# Patient Record
Sex: FEMALE | Race: BLACK OR AFRICAN AMERICAN | NOT HISPANIC OR LATINO | URBAN - METROPOLITAN AREA
[De-identification: names, ages, dates, MRNs, and addresses within clinical notes are randomized per-mention and may not be internally consistent; named-entity substitution may affect disease eponyms.]

---

## 2023-05-16 ENCOUNTER — HOSPITAL ENCOUNTER (OUTPATIENT)
Facility: OTHER | Age: 63
Discharge: HOME | End: 2023-05-19
Attending: EMERGENCY MEDICINE | Admitting: INTERNAL MEDICINE
Payer: MEDICAID

## 2023-05-16 DIAGNOSIS — R50.9 FEVER: ICD-10-CM

## 2023-05-16 DIAGNOSIS — Z79.4 TYPE 2 DIABETES MELLITUS WITH HYPERGLYCEMIA, WITH LONG-TERM CURRENT USE OF INSULIN: ICD-10-CM

## 2023-05-16 DIAGNOSIS — N17.9 AKI (ACUTE KIDNEY INJURY): ICD-10-CM

## 2023-05-16 DIAGNOSIS — G93.40 ACUTE ENCEPHALOPATHY: Primary | ICD-10-CM

## 2023-05-16 DIAGNOSIS — R41.82 AMS (ALTERED MENTAL STATUS): ICD-10-CM

## 2023-05-16 DIAGNOSIS — B20 HIV DISEASE: ICD-10-CM

## 2023-05-16 DIAGNOSIS — E11.65 TYPE 2 DIABETES MELLITUS WITH HYPERGLYCEMIA, WITH LONG-TERM CURRENT USE OF INSULIN: ICD-10-CM

## 2023-05-16 LAB
ALBUMIN SERPL BCP-MCNC: 4.1 G/DL (ref 3.5–5.2)
ALP SERPL-CCNC: 100 U/L (ref 55–135)
ALT SERPL W/O P-5'-P-CCNC: 41 U/L (ref 10–44)
AMPHET+METHAMPHET UR QL: NEGATIVE
ANION GAP SERPL CALC-SCNC: 11 MMOL/L (ref 8–16)
AST SERPL-CCNC: 49 U/L (ref 10–40)
BACTERIA #/AREA URNS HPF: ABNORMAL /HPF
BARBITURATES UR QL SCN>200 NG/ML: NEGATIVE
BASOPHILS # BLD AUTO: 0.07 K/UL (ref 0–0.2)
BASOPHILS NFR BLD: 0.8 % (ref 0–1.9)
BENZODIAZ UR QL SCN>200 NG/ML: NEGATIVE
BILIRUB SERPL-MCNC: 0.4 MG/DL (ref 0.1–1)
BILIRUB UR QL STRIP: NEGATIVE
BUN SERPL-MCNC: 30 MG/DL (ref 8–23)
BZE UR QL SCN: NEGATIVE
CALCIUM SERPL-MCNC: 9.7 MG/DL (ref 8.7–10.5)
CANNABINOIDS UR QL SCN: ABNORMAL
CHLORIDE SERPL-SCNC: 104 MMOL/L (ref 95–110)
CLARITY CSF: CLEAR
CLARITY UR: ABNORMAL
CO2 SERPL-SCNC: 24 MMOL/L (ref 23–29)
COLOR CSF: COLORLESS
COLOR UR: YELLOW
CREAT SERPL-MCNC: 1.8 MG/DL (ref 0.5–1.4)
CREAT SERPL-MCNC: 2 MG/DL (ref 0.5–1.4)
CREAT UR-MCNC: 79.3 MG/DL (ref 15–325)
CSF TUBE NUMBER: 1
CSF TUBE NUMBER: 1
CSF, COMMENT: ABNORMAL
CTP QC/QA: YES
DIFFERENTIAL METHOD: ABNORMAL
EOSINOPHIL # BLD AUTO: 0.1 K/UL (ref 0–0.5)
EOSINOPHIL NFR BLD: 1.1 % (ref 0–8)
ERYTHROCYTE [DISTWIDTH] IN BLOOD BY AUTOMATED COUNT: 11.3 % (ref 11.5–14.5)
EST. GFR  (NO RACE VARIABLE): 28 ML/MIN/1.73 M^2
ETHANOL SERPL-MCNC: <10 MG/DL
ETHANOL UR-MCNC: <10 MG/DL
FIO2: 21
GLUCOSE CSF-MCNC: 135 MG/DL (ref 40–70)
GLUCOSE SERPL-MCNC: 173 MG/DL (ref 70–110)
GLUCOSE UR QL STRIP: ABNORMAL
HCT VFR BLD AUTO: 38.4 % (ref 37–48.5)
HGB BLD-MCNC: 13 G/DL (ref 12–16)
HGB UR QL STRIP: NEGATIVE
IMM GRANULOCYTES # BLD AUTO: 0.03 K/UL (ref 0–0.04)
IMM GRANULOCYTES NFR BLD AUTO: 0.3 % (ref 0–0.5)
KETONES UR QL STRIP: NEGATIVE
LDH SERPL L TO P-CCNC: 2.25 MMOL/L (ref 0.5–2.2)
LEUKOCYTE ESTERASE UR QL STRIP: ABNORMAL
LYMPHOCYTES # BLD AUTO: 3.2 K/UL (ref 1–4.8)
LYMPHOCYTES NFR BLD: 36.6 % (ref 18–48)
MCH RBC QN AUTO: 33.8 PG (ref 27–31)
MCHC RBC AUTO-ENTMCNC: 33.9 G/DL (ref 32–36)
MCV RBC AUTO: 100 FL (ref 82–98)
METHADONE UR QL SCN>300 NG/ML: NEGATIVE
MICROSCOPIC COMMENT: ABNORMAL
MONOCYTES # BLD AUTO: 0.7 K/UL (ref 0.3–1)
MONOCYTES NFR BLD: 8.3 % (ref 4–15)
NEUTROPHILS # BLD AUTO: 4.6 K/UL (ref 1.8–7.7)
NEUTROPHILS NFR BLD: 52.9 % (ref 38–73)
NITRITE UR QL STRIP: POSITIVE
NRBC BLD-RTO: 0 /100 WBC
OPIATES UR QL SCN: NEGATIVE
PCP UR QL SCN>25 NG/ML: NEGATIVE
PH UR STRIP: 7 [PH] (ref 5–8)
PLATELET # BLD AUTO: 224 K/UL (ref 150–450)
PMV BLD AUTO: 9.3 FL (ref 9.2–12.9)
POC TEMPERATURE: ABNORMAL
POCT GLUCOSE: 144 MG/DL (ref 70–110)
POTASSIUM SERPL-SCNC: 4 MMOL/L (ref 3.5–5.1)
PROCALCITONIN SERPL IA-MCNC: 0.06 NG/ML
PROT CSF-MCNC: 90 MG/DL (ref 15–40)
PROT SERPL-MCNC: 8.5 G/DL (ref 6–8.4)
PROT UR QL STRIP: NEGATIVE
RBC # BLD AUTO: 3.85 M/UL (ref 4–5.4)
RBC # CSF: 6 /CU MM
RBC #/AREA URNS HPF: 2 /HPF (ref 0–4)
SAMPLE: ABNORMAL
SAMPLE: ABNORMAL
SARS-COV-2 RDRP RESP QL NAA+PROBE: NEGATIVE
SODIUM SERPL-SCNC: 139 MMOL/L (ref 136–145)
SP GR UR STRIP: 1.01 (ref 1–1.03)
SPECIMEN VOL CSF: 7 ML
SQUAMOUS #/AREA URNS HPF: 6 /HPF
TOXICOLOGY INFORMATION: ABNORMAL
URN SPEC COLLECT METH UR: ABNORMAL
UROBILINOGEN UR STRIP-ACNC: NEGATIVE EU/DL
WBC # BLD AUTO: 8.72 K/UL (ref 3.9–12.7)
WBC # CSF: 0 /CU MM (ref 0–5)
WBC #/AREA URNS HPF: 69 /HPF (ref 0–5)
YEAST URNS QL MICRO: ABNORMAL

## 2023-05-16 PROCEDURE — 93010 ELECTROCARDIOGRAM REPORT: CPT | Mod: ,,, | Performed by: INTERNAL MEDICINE

## 2023-05-16 PROCEDURE — 63600175 PHARM REV CODE 636 W HCPCS: Performed by: EMERGENCY MEDICINE

## 2023-05-16 PROCEDURE — 99223 PR INITIAL HOSPITAL CARE,LEVL III: ICD-10-PCS | Mod: ,,, | Performed by: NURSE PRACTITIONER

## 2023-05-16 PROCEDURE — 82077 ASSAY SPEC XCP UR&BREATH IA: CPT | Performed by: EMERGENCY MEDICINE

## 2023-05-16 PROCEDURE — 80053 COMPREHEN METABOLIC PANEL: CPT | Performed by: EMERGENCY MEDICINE

## 2023-05-16 PROCEDURE — 84157 ASSAY OF PROTEIN OTHER: CPT | Performed by: EMERGENCY MEDICINE

## 2023-05-16 PROCEDURE — 80307 DRUG TEST PRSMV CHEM ANLYZR: CPT | Performed by: EMERGENCY MEDICINE

## 2023-05-16 PROCEDURE — G0378 HOSPITAL OBSERVATION PER HR: HCPCS

## 2023-05-16 PROCEDURE — 96366 THER/PROPH/DIAG IV INF ADDON: CPT

## 2023-05-16 PROCEDURE — 87205 SMEAR GRAM STAIN: CPT | Performed by: EMERGENCY MEDICINE

## 2023-05-16 PROCEDURE — 62270 DX LMBR SPI PNXR: CPT

## 2023-05-16 PROCEDURE — 96367 TX/PROPH/DG ADDL SEQ IV INF: CPT | Mod: 59

## 2023-05-16 PROCEDURE — 87088 URINE BACTERIA CULTURE: CPT | Performed by: EMERGENCY MEDICINE

## 2023-05-16 PROCEDURE — 84145 PROCALCITONIN (PCT): CPT | Performed by: EMERGENCY MEDICINE

## 2023-05-16 PROCEDURE — 87086 URINE CULTURE/COLONY COUNT: CPT | Performed by: EMERGENCY MEDICINE

## 2023-05-16 PROCEDURE — 96361 HYDRATE IV INFUSION ADD-ON: CPT | Mod: 59

## 2023-05-16 PROCEDURE — 93010 EKG 12-LEAD: ICD-10-PCS | Mod: ,,, | Performed by: INTERNAL MEDICINE

## 2023-05-16 PROCEDURE — 82565 ASSAY OF CREATININE: CPT

## 2023-05-16 PROCEDURE — 25000003 PHARM REV CODE 250: Performed by: NURSE PRACTITIONER

## 2023-05-16 PROCEDURE — 99223 1ST HOSP IP/OBS HIGH 75: CPT | Mod: ,,, | Performed by: NURSE PRACTITIONER

## 2023-05-16 PROCEDURE — 81000 URINALYSIS NONAUTO W/SCOPE: CPT | Performed by: EMERGENCY MEDICINE

## 2023-05-16 PROCEDURE — 87070 CULTURE OTHR SPECIMN AEROBIC: CPT | Performed by: EMERGENCY MEDICINE

## 2023-05-16 PROCEDURE — 83605 ASSAY OF LACTIC ACID: CPT

## 2023-05-16 PROCEDURE — 82945 GLUCOSE OTHER FLUID: CPT | Performed by: EMERGENCY MEDICINE

## 2023-05-16 PROCEDURE — 25000003 PHARM REV CODE 250: Performed by: EMERGENCY MEDICINE

## 2023-05-16 PROCEDURE — 99291 CRITICAL CARE FIRST HOUR: CPT

## 2023-05-16 PROCEDURE — 93005 ELECTROCARDIOGRAM TRACING: CPT | Mod: 59

## 2023-05-16 PROCEDURE — 89051 BODY FLUID CELL COUNT: CPT | Performed by: EMERGENCY MEDICINE

## 2023-05-16 PROCEDURE — 82962 GLUCOSE BLOOD TEST: CPT

## 2023-05-16 PROCEDURE — 96365 THER/PROPH/DIAG IV INF INIT: CPT | Mod: 59

## 2023-05-16 PROCEDURE — 85025 COMPLETE CBC W/AUTO DIFF WBC: CPT | Performed by: EMERGENCY MEDICINE

## 2023-05-16 PROCEDURE — 99000 SPECIMEN HANDLING OFFICE-LAB: CPT | Performed by: EMERGENCY MEDICINE

## 2023-05-16 PROCEDURE — 99900035 HC TECH TIME PER 15 MIN (STAT)

## 2023-05-16 PROCEDURE — 87040 BLOOD CULTURE FOR BACTERIA: CPT | Mod: 59 | Performed by: EMERGENCY MEDICINE

## 2023-05-16 RX ORDER — HYDROXYZINE PAMOATE 25 MG/1
25 CAPSULE ORAL 3 TIMES DAILY
COMMUNITY
Start: 2023-05-12

## 2023-05-16 RX ORDER — PRAVASTATIN SODIUM 40 MG/1
40 TABLET ORAL
COMMUNITY
Start: 2023-04-19

## 2023-05-16 RX ORDER — HEPARIN SODIUM 5000 [USP'U]/ML
5000 INJECTION, SOLUTION INTRAVENOUS; SUBCUTANEOUS EVERY 8 HOURS
Status: DISCONTINUED | OUTPATIENT
Start: 2023-05-17 | End: 2023-05-19 | Stop reason: HOSPADM

## 2023-05-16 RX ORDER — ELVITEGRAVIR, COBICISTAT, EMTRICITABINE, AND TENOFOVIR ALAFENAMIDE 150; 150; 200; 10 MG/1; MG/1; MG/1; MG/1
1 TABLET ORAL
COMMUNITY
Start: 2022-09-14

## 2023-05-16 RX ORDER — NALOXONE HCL 0.4 MG/ML
0.02 VIAL (ML) INJECTION
Status: DISCONTINUED | OUTPATIENT
Start: 2023-05-16 | End: 2023-05-19 | Stop reason: HOSPADM

## 2023-05-16 RX ORDER — ACETAMINOPHEN 500 MG
1000 TABLET ORAL
Status: COMPLETED | OUTPATIENT
Start: 2023-05-16 | End: 2023-05-16

## 2023-05-16 RX ORDER — DEXTROSE 40 %
30 GEL (GRAM) ORAL
Status: DISCONTINUED | OUTPATIENT
Start: 2023-05-16 | End: 2023-05-19 | Stop reason: HOSPADM

## 2023-05-16 RX ORDER — INSULIN GLARGINE 100 [IU]/ML
30 INJECTION, SOLUTION SUBCUTANEOUS
COMMUNITY
Start: 2023-04-19

## 2023-05-16 RX ORDER — BUPROPION HYDROCHLORIDE 150 MG/1
150 TABLET ORAL EVERY MORNING
COMMUNITY
Start: 2023-05-03

## 2023-05-16 RX ORDER — SODIUM CHLORIDE 9 MG/ML
INJECTION, SOLUTION INTRAVENOUS CONTINUOUS
Status: DISCONTINUED | OUTPATIENT
Start: 2023-05-16 | End: 2023-05-17

## 2023-05-16 RX ORDER — DULAGLUTIDE 1.5 MG/.5ML
1.5 INJECTION, SOLUTION SUBCUTANEOUS
COMMUNITY
Start: 2023-03-08

## 2023-05-16 RX ORDER — FAMOTIDINE 40 MG/1
40 TABLET, FILM COATED ORAL
COMMUNITY
Start: 2023-05-12

## 2023-05-16 RX ORDER — INSULIN LISPRO 100 [IU]/ML
22 INJECTION, SOLUTION INTRAVENOUS; SUBCUTANEOUS
COMMUNITY
Start: 2023-04-19

## 2023-05-16 RX ORDER — GLUCAGON 1 MG
1 KIT INJECTION
Status: DISCONTINUED | OUTPATIENT
Start: 2023-05-16 | End: 2023-05-19 | Stop reason: HOSPADM

## 2023-05-16 RX ORDER — ONDANSETRON 2 MG/ML
4 INJECTION INTRAMUSCULAR; INTRAVENOUS EVERY 8 HOURS PRN
Status: DISCONTINUED | OUTPATIENT
Start: 2023-05-16 | End: 2023-05-19 | Stop reason: HOSPADM

## 2023-05-16 RX ORDER — DEXTROSE 40 %
15 GEL (GRAM) ORAL
Status: DISCONTINUED | OUTPATIENT
Start: 2023-05-16 | End: 2023-05-19 | Stop reason: HOSPADM

## 2023-05-16 RX ORDER — ACETAMINOPHEN 325 MG/1
650 TABLET ORAL EVERY 4 HOURS PRN
Status: DISCONTINUED | OUTPATIENT
Start: 2023-05-16 | End: 2023-05-19 | Stop reason: HOSPADM

## 2023-05-16 RX ORDER — INSULIN ASPART 100 [IU]/ML
1-10 INJECTION, SOLUTION INTRAVENOUS; SUBCUTANEOUS
Status: DISCONTINUED | OUTPATIENT
Start: 2023-05-16 | End: 2023-05-19 | Stop reason: HOSPADM

## 2023-05-16 RX ORDER — GABAPENTIN 800 MG/1
800 TABLET ORAL 3 TIMES DAILY
COMMUNITY
Start: 2023-05-03

## 2023-05-16 RX ORDER — SODIUM CHLORIDE 0.9 % (FLUSH) 0.9 %
10 SYRINGE (ML) INJECTION EVERY 8 HOURS PRN
Status: DISCONTINUED | OUTPATIENT
Start: 2023-05-16 | End: 2023-05-19 | Stop reason: HOSPADM

## 2023-05-16 RX ADMIN — VANCOMYCIN HYDROCHLORIDE 2000 MG: 500 INJECTION, POWDER, LYOPHILIZED, FOR SOLUTION INTRAVENOUS at 09:05

## 2023-05-16 RX ADMIN — ACETAMINOPHEN 1000 MG: 500 TABLET, FILM COATED ORAL at 08:05

## 2023-05-16 RX ADMIN — SODIUM CHLORIDE: 9 INJECTION, SOLUTION INTRAVENOUS at 11:05

## 2023-05-16 RX ADMIN — CEFTRIAXONE SODIUM 2 G: 2 INJECTION, POWDER, FOR SOLUTION INTRAMUSCULAR; INTRAVENOUS at 08:05

## 2023-05-16 RX ADMIN — ACETAMINOPHEN 650 MG: 325 TABLET, FILM COATED ORAL at 11:05

## 2023-05-16 RX ADMIN — SODIUM CHLORIDE 1000 ML: 9 INJECTION, SOLUTION INTRAVENOUS at 08:05

## 2023-05-16 NOTE — Clinical Note
Diagnosis: AMS (altered mental status) [[id number]]   Future Attending Provider: MJ ALBERT [[id number]]   Is the patient being sent to ED Observation?: No   Admitting Provider:: MJ ALBERT [[id number]]   Special Needs:: Fall Risk [15]

## 2023-05-17 LAB
ALBUMIN SERPL BCP-MCNC: 3.7 G/DL (ref 3.5–5.2)
ALP SERPL-CCNC: 85 U/L (ref 55–135)
ALT SERPL W/O P-5'-P-CCNC: 36 U/L (ref 10–44)
ANION GAP SERPL CALC-SCNC: 9 MMOL/L (ref 8–16)
AST SERPL-CCNC: 36 U/L (ref 10–40)
BASOPHILS # BLD AUTO: 0.04 K/UL (ref 0–0.2)
BASOPHILS NFR BLD: 0.7 % (ref 0–1.9)
BILIRUB SERPL-MCNC: 0.3 MG/DL (ref 0.1–1)
BUN SERPL-MCNC: 23 MG/DL (ref 8–23)
CALCIUM SERPL-MCNC: 8.7 MG/DL (ref 8.7–10.5)
CHLORIDE SERPL-SCNC: 108 MMOL/L (ref 95–110)
CO2 SERPL-SCNC: 21 MMOL/L (ref 23–29)
CREAT SERPL-MCNC: 1.3 MG/DL (ref 0.5–1.4)
DIFFERENTIAL METHOD: ABNORMAL
EOSINOPHIL # BLD AUTO: 0.1 K/UL (ref 0–0.5)
EOSINOPHIL NFR BLD: 1.3 % (ref 0–8)
ERYTHROCYTE [DISTWIDTH] IN BLOOD BY AUTOMATED COUNT: 11.2 % (ref 11.5–14.5)
EST. GFR  (NO RACE VARIABLE): 46 ML/MIN/1.73 M^2
ESTIMATED AVG GLUCOSE: 255 MG/DL (ref 68–131)
GLUCOSE SERPL-MCNC: 266 MG/DL (ref 70–110)
HBA1C MFR BLD: 10.5 % (ref 4–5.6)
HCT VFR BLD AUTO: 37.3 % (ref 37–48.5)
HGB BLD-MCNC: 12.4 G/DL (ref 12–16)
IMM GRANULOCYTES # BLD AUTO: 0.02 K/UL (ref 0–0.04)
IMM GRANULOCYTES NFR BLD AUTO: 0.4 % (ref 0–0.5)
LACTATE SERPL-SCNC: 1.8 MMOL/L (ref 0.5–2.2)
LYMPHOCYTES # BLD AUTO: 1.9 K/UL (ref 1–4.8)
LYMPHOCYTES NFR BLD: 35.1 % (ref 18–48)
MAGNESIUM SERPL-MCNC: 1.9 MG/DL (ref 1.6–2.6)
MCH RBC QN AUTO: 33.3 PG (ref 27–31)
MCHC RBC AUTO-ENTMCNC: 33.2 G/DL (ref 32–36)
MCV RBC AUTO: 100 FL (ref 82–98)
MONOCYTES # BLD AUTO: 0.5 K/UL (ref 0.3–1)
MONOCYTES NFR BLD: 8.5 % (ref 4–15)
NEUTROPHILS # BLD AUTO: 2.9 K/UL (ref 1.8–7.7)
NEUTROPHILS NFR BLD: 54 % (ref 38–73)
NRBC BLD-RTO: 0 /100 WBC
PHOSPHATE SERPL-MCNC: 3.3 MG/DL (ref 2.7–4.5)
PLATELET # BLD AUTO: 187 K/UL (ref 150–450)
PMV BLD AUTO: 9.6 FL (ref 9.2–12.9)
POCT GLUCOSE: 233 MG/DL (ref 70–110)
POCT GLUCOSE: 279 MG/DL (ref 70–110)
POCT GLUCOSE: 281 MG/DL (ref 70–110)
POCT GLUCOSE: 307 MG/DL (ref 70–110)
POCT GLUCOSE: 342 MG/DL (ref 70–110)
POTASSIUM SERPL-SCNC: 4.4 MMOL/L (ref 3.5–5.1)
PROT SERPL-MCNC: 7.6 G/DL (ref 6–8.4)
RBC # BLD AUTO: 3.72 M/UL (ref 4–5.4)
SODIUM SERPL-SCNC: 138 MMOL/L (ref 136–145)
VANCOMYCIN SERPL-MCNC: 8 UG/ML
WBC # BLD AUTO: 5.41 K/UL (ref 3.9–12.7)

## 2023-05-17 PROCEDURE — A9585 GADOBUTROL INJECTION: HCPCS | Performed by: INTERNAL MEDICINE

## 2023-05-17 PROCEDURE — 96372 THER/PROPH/DIAG INJ SC/IM: CPT | Mod: 59 | Performed by: NURSE PRACTITIONER

## 2023-05-17 PROCEDURE — 85025 COMPLETE CBC W/AUTO DIFF WBC: CPT | Performed by: NURSE PRACTITIONER

## 2023-05-17 PROCEDURE — 84100 ASSAY OF PHOSPHORUS: CPT | Performed by: NURSE PRACTITIONER

## 2023-05-17 PROCEDURE — 36415 COLL VENOUS BLD VENIPUNCTURE: CPT | Performed by: EMERGENCY MEDICINE

## 2023-05-17 PROCEDURE — 25000003 PHARM REV CODE 250: Performed by: NURSE PRACTITIONER

## 2023-05-17 PROCEDURE — 83735 ASSAY OF MAGNESIUM: CPT | Performed by: NURSE PRACTITIONER

## 2023-05-17 PROCEDURE — 96366 THER/PROPH/DIAG IV INF ADDON: CPT

## 2023-05-17 PROCEDURE — 80053 COMPREHEN METABOLIC PANEL: CPT | Performed by: NURSE PRACTITIONER

## 2023-05-17 PROCEDURE — 25500020 PHARM REV CODE 255: Performed by: INTERNAL MEDICINE

## 2023-05-17 PROCEDURE — 94761 N-INVAS EAR/PLS OXIMETRY MLT: CPT

## 2023-05-17 PROCEDURE — 83036 HEMOGLOBIN GLYCOSYLATED A1C: CPT | Performed by: NURSE PRACTITIONER

## 2023-05-17 PROCEDURE — 83605 ASSAY OF LACTIC ACID: CPT | Performed by: EMERGENCY MEDICINE

## 2023-05-17 PROCEDURE — 80202 ASSAY OF VANCOMYCIN: CPT | Performed by: INTERNAL MEDICINE

## 2023-05-17 PROCEDURE — G0378 HOSPITAL OBSERVATION PER HR: HCPCS

## 2023-05-17 PROCEDURE — 96361 HYDRATE IV INFUSION ADD-ON: CPT

## 2023-05-17 PROCEDURE — 36415 COLL VENOUS BLD VENIPUNCTURE: CPT | Performed by: NURSE PRACTITIONER

## 2023-05-17 PROCEDURE — 99204 OFFICE O/P NEW MOD 45 MIN: CPT | Mod: ,,, | Performed by: INTERNAL MEDICINE

## 2023-05-17 PROCEDURE — 36415 COLL VENOUS BLD VENIPUNCTURE: CPT | Performed by: INTERNAL MEDICINE

## 2023-05-17 PROCEDURE — 63600175 PHARM REV CODE 636 W HCPCS: Performed by: NURSE PRACTITIONER

## 2023-05-17 PROCEDURE — 99204 PR OFFICE/OUTPT VISIT, NEW, LEVL IV, 45-59 MIN: ICD-10-PCS | Mod: ,,, | Performed by: INTERNAL MEDICINE

## 2023-05-17 RX ORDER — LANOLIN ALCOHOL/MO/W.PET/CERES
400 CREAM (GRAM) TOPICAL DAILY
Status: DISCONTINUED | OUTPATIENT
Start: 2023-05-18 | End: 2023-05-19 | Stop reason: HOSPADM

## 2023-05-17 RX ORDER — HYDROCODONE BITARTRATE AND ACETAMINOPHEN 5; 325 MG/1; MG/1
1 TABLET ORAL EVERY 6 HOURS PRN
Status: DISCONTINUED | OUTPATIENT
Start: 2023-05-17 | End: 2023-05-19 | Stop reason: HOSPADM

## 2023-05-17 RX ADMIN — HEPARIN SODIUM 5000 UNITS: 5000 INJECTION INTRAVENOUS; SUBCUTANEOUS at 01:05

## 2023-05-17 RX ADMIN — INSULIN ASPART 4 UNITS: 100 INJECTION, SOLUTION INTRAVENOUS; SUBCUTANEOUS at 10:05

## 2023-05-17 RX ADMIN — ELVITEGRAVIR, COBICISTAT, EMTRICITABINE, AND TENOFOVIR ALAFENAMIDE 1 TABLET: 150; 150; 200; 10 TABLET ORAL at 08:05

## 2023-05-17 RX ADMIN — INSULIN ASPART 6 UNITS: 100 INJECTION, SOLUTION INTRAVENOUS; SUBCUTANEOUS at 03:05

## 2023-05-17 RX ADMIN — SODIUM CHLORIDE: 9 INJECTION, SOLUTION INTRAVENOUS at 05:05

## 2023-05-17 RX ADMIN — VANCOMYCIN HYDROCHLORIDE 1250 MG: 1.25 INJECTION, POWDER, LYOPHILIZED, FOR SOLUTION INTRAVENOUS at 11:05

## 2023-05-17 RX ADMIN — HEPARIN SODIUM 5000 UNITS: 5000 INJECTION INTRAVENOUS; SUBCUTANEOUS at 09:05

## 2023-05-17 RX ADMIN — HYDROCODONE BITARTRATE AND ACETAMINOPHEN 1 TABLET: 5; 325 TABLET ORAL at 09:05

## 2023-05-17 RX ADMIN — HEPARIN SODIUM 5000 UNITS: 5000 INJECTION INTRAVENOUS; SUBCUTANEOUS at 05:05

## 2023-05-17 RX ADMIN — INSULIN DETEMIR 15 UNITS: 100 INJECTION, SOLUTION SUBCUTANEOUS at 10:05

## 2023-05-17 RX ADMIN — INSULIN ASPART 4 UNITS: 100 INJECTION, SOLUTION INTRAVENOUS; SUBCUTANEOUS at 09:05

## 2023-05-17 RX ADMIN — ACETAMINOPHEN 650 MG: 325 TABLET, FILM COATED ORAL at 11:05

## 2023-05-17 RX ADMIN — GADOBUTROL 7 ML: 604.72 INJECTION INTRAVENOUS at 08:05

## 2023-05-17 NOTE — CONSULTS
[facility] ([address])  Infectious Disease  Consult Note    Patient Name: Jori Richmond  MRN: [id number]  Admission Date: 5/16/2023  Hospital Length of Stay: 0 days  Attending Physician: Venus Fleming MD  Primary Care Provider: Primary Doctor No     Isolation Status: Airborne and Contact and Droplet    Patient information was obtained from patient, past medical records and ER records.      Inpatient consult to Infectious Diseases  Consult performed by: Ac Luong MD  Consult ordered by: Venus Fleming MD        Assessment/Plan:     Neuro  * Acute encephalopathy  - patient is not encephalopathic this am  - suspect MS or Neuro (migraine?)  - CSF is without WBCs and patient is not immunocompromised by CD4  - would stop abx and pursue non-infectious etiologies    ID  HIV disease  - followed by [facility] at [facility] (Dr. Pereira)  - on Genvoya  - last CD4 731(3/23)  - continue same  - f/u Dr. Pereira after discharge    Thank you for your consult. I will sign off. Please contact us if you have any additional questions.    Ac Luong MD  Infectious Disease  [facility] ([address])    Subjective:     Principal Problem: Acute encephalopathy    HPI: 61 yo woman with h/o migraine headaches, admitted with worsening headache. The patient tells me that she was prescribed antimigraine medication but it did not help. She presented to the ED and underwent CSF analysis demonstrating elevated protein (9-0) but no WBCs.She was admitted to the hospital and started on abx. The patient also has HIV infection and is on cART. She does not remember her HIV provider but is adherent with ART.     HIV Care: [facility]  Last seen by [facility] 6/2019, [facility], well controlled, 8/2022 s/p septoplastic & BL interfior tubrinate reduction 2/2 chronic sinusitis that may have been the underlying cause of her subjective headaches, migraine sxs improved.    12/21/2020 CT C-spine Partial corpectomy of C6 with  interbody cage. ACDF plate and screw fixation hardware extending from C4 through C7. Interbody spacer at C4-5. Hardware is intact and stable compared to prior. Multilevel cervical facet arthropathy. Degenerative change at C1-2 articulations bilaterally. (As per imaging study); [facility] appt 9/15/23, following-up with them, CT scan of neck as per them      Healthcare maintenance  Mammo - 2/24/23 ordered, managed by PCP  C-scope - 2018, managed by PCP  Hep A - immune  Hep B - immune  Hep C - will check HCV PCR  T-spot - 9/14/22 negative  GC/Chlamydia - 9/14/22 negative  Syphilis - 9/14/22 negative  Sees GYN for urinary incontenence  DEXA - due  Vaccines - declined        Past Medical History:   Diagnosis Date    Diabetes mellitus     Digestive disorder        No past surgical history on file.    Review of patient's allergies indicates:   Allergen Reactions    Penicillins Swelling    Dapsone      G6PD deficiency  G6PD deficiency      Sulfamethoxazole-trimethoprim      G6PD deficiency  G6PD deficiency         Medications:  Medications Prior to Admission   Medication Sig    dulaglutide (TRULICITY) 1.5 mg/0.5 mL pen injector Inject 1.5 mg into the skin.    elviteg-cob-emtri-tenof ALAFEN (GENVOYA) [phone] mg Tab Take 1 tablet by mouth.    insulin (LANTUS SOLOSTAR U-100 INSULIN) glargine 100 units/mL SubQ pen Inject 30 Units into the skin.    insulin lispro (HUMALOG KWIKPEN INSULIN) 100 unit/mL pen Inject 22 Units into the skin.    buPROPion (WELLBUTRIN XL) 150 MG TB24 tablet Take 150 mg by mouth every morning.    famotidine (PEPCID) 40 MG tablet Take 40 mg by mouth.    gabapentin (NEURONTIN) 800 MG tablet Take 800 mg by mouth 3 (three) times daily.    hydrOXYzine pamoate (VISTARIL) 25 MG Cap Take 25 mg by mouth 3 (three) times daily.    pravastatin (PRAVACHOL) 40 MG tablet Take 40 mg by mouth.     Antibiotics (From admission, onward)      Start     Stop Route Frequency Ordered    05/17/23 0148  vancomycin  - pharmacy to dose  (vancomycin IVPB)        See Taryn for full Linked Orders Report.    -- IV pharmacy to manage frequency 05/17/23 0050          Antifungals (From admission, onward)      None          Antivirals (From admission, onward)          Stop Route Frequency     elviteg-cob-emtri-tenof ALAFEN         -- Oral Daily             Immunization History   Administered Date(s) Administered    COVID-19, MRNA, LN-S, PF (Pfizer) (Purple Cap) 03/09/2021, 03/31/2021       Family History       Family history is unknown by patient.          Social History     Socioeconomic History    Marital status: Single     Review of Systems   Constitutional:  Negative for fever.   Musculoskeletal:  Positive for arthralgias.   All other systems reviewed and are negative.  Objective:     Vital Signs (Most Recent):  Temp: 97.8 °F (36.6 °C) (05/17/23 0807)  Pulse: 86 (05/17/23 0957)  Resp: 18 (05/17/23 0807)  BP: (!) 156/76 (05/17/23 0807)  SpO2: 98 % (05/17/23 0807) Vital Signs (24h Range):  Temp:  [97.6 °F (36.4 °C)-100.5 °F (38.1 °C)] 97.8 °F (36.6 °C)  Pulse:  [75-96] 86  Resp:  [17-64] 18  SpO2:  [94 %-98 %] 98 %  BP: (123-156)/(62-77) 156/76     Weight: 70.7 kg (155 lb 13.8 oz)  Body mass index is 25.16 kg/m².    Estimated Creatinine Clearance: 42 mL/min (based on SCr of 1.3 mg/dL).     Physical Exam  Vitals and nursing note reviewed.   Constitutional:       General: She is not in acute distress.     Appearance: Normal appearance. She is not ill-appearing, toxic-appearing or diaphoretic.   HENT:      Head: Normocephalic and atraumatic.   Eyes:      Pupils: Pupils are equal, round, and reactive to light.   Cardiovascular:      Rate and Rhythm: Normal rate.   Neurological:      General: No focal deficit present.      Mental Status: She is alert and oriented to person, place, and time. Mental status is at baseline.   Psychiatric:         Mood and Affect: Mood normal.         Behavior: Behavior normal.         Thought Content:  Thought content normal.         Judgment: Judgment normal.        Significant Labs: Blood Culture: No results for input(s): LABBLOO in the last 4320 hours.  C4 Count: No results for input(s): C4 in the last 48 hours.  CBC:   Recent Labs   Lab 05/16/23 1958 05/17/23  0440   WBC 8.72 5.41   HGB 13.0 12.4   HCT 38.4 37.3    187     CMP:   Recent Labs   Lab 05/16/23 1958 05/17/23  0440    138   K 4.0 4.4    108   CO2 24 21*   * 266*   BUN 30* 23   CREATININE 2.0* 1.3   CALCIUM 9.7 8.7   PROT 8.5* 7.6   ALBUMIN 4.1 3.7   BILITOT 0.4 0.3   ALKPHOS 100 85   AST 49* 36   ALT 41 36   ANIONGAP 11 9     CSF: No results for input(s): CSFCULTURE in the last 4320 hours.    Significant Imaging: I have reviewed all pertinent imaging results/findings within the past 24 hours.

## 2023-05-17 NOTE — SUBJECTIVE & OBJECTIVE
Interval History: Still having headache and neck pain, is okay with the light on as long as she doesn't look directly at it    Review of Systems   Constitutional:  Negative for activity change, appetite change and fever.   HENT:  Negative for congestion, ear pain, rhinorrhea and sinus pressure.    Eyes:  Negative for pain and discharge.   Respiratory:  Negative for cough, chest tightness, shortness of breath and wheezing.    Cardiovascular:  Negative for chest pain and leg swelling.   Gastrointestinal:  Negative for abdominal distention, abdominal pain, diarrhea, nausea and vomiting.   Endocrine: Negative for cold intolerance and heat intolerance.   Genitourinary:  Negative for difficulty urinating, flank pain, frequency, hematuria and urgency.   Musculoskeletal:  Negative for arthralgias, joint swelling and myalgias.   Allergic/Immunologic: Negative for environmental allergies and food allergies.   Neurological:  Positive for dizziness, weakness and headaches. Negative for light-headedness.   Hematological:  Does not bruise/bleed easily.   Psychiatric/Behavioral:  Negative for agitation, behavioral problems and decreased concentration.    Objective:     Vital Signs (Most Recent):  Temp: 98.6 °F (37 °C) (05/17/23 1556)  Pulse: 81 (05/17/23 1558)  Resp: 18 (05/17/23 1118)  BP: (!) 148/75 (05/17/23 1556)  SpO2: 98 % (05/17/23 1556) Vital Signs (24h Range):  Temp:  [97.6 °F (36.4 °C)-100.5 °F (38.1 °C)] 98.6 °F (37 °C)  Pulse:  [75-96] 81  Resp:  [17-64] 18  SpO2:  [94 %-99 %] 98 %  BP: (122-156)/(62-77) 148/75     Weight: 70.7 kg (155 lb 13.8 oz)  Body mass index is 25.16 kg/m².    Intake/Output Summary (Last 24 hours) at 5/17/2023 1637  Last data filed at 5/17/2023 1103  Gross per 24 hour   Intake 1608.85 ml   Output 1200 ml   Net 408.85 ml         Physical Exam  Constitutional:       Appearance: Normal appearance. She is well-developed.   HENT:      Head: Normocephalic.   Eyes:      General:         Right eye: No  discharge.         Left eye: No discharge.      Conjunctiva/sclera: Conjunctivae normal.   Cardiovascular:      Rate and Rhythm: Normal rate and regular rhythm.      Pulses:           Radial pulses are 2+ on the right side and 2+ on the left side.      Heart sounds: Normal heart sounds.   Pulmonary:      Effort: Pulmonary effort is normal. No respiratory distress.      Breath sounds: Normal breath sounds.   Abdominal:      General: Bowel sounds are normal. There is no distension.      Palpations: Abdomen is soft.      Tenderness: There is no abdominal tenderness.   Musculoskeletal:         General: Normal range of motion.      Cervical back: Normal range of motion and neck supple.   Skin:     General: Skin is warm and dry.   Neurological:      Mental Status: She is alert and oriented to person, place, and time.      GCS: GCS eye subscore is 4. GCS verbal subscore is 5. GCS motor subscore is 6.      Motor: Motor function is intact.   Psychiatric:         Mood and Affect: Mood normal.         Speech: Speech normal.         Behavior: Behavior normal.         Thought Content: Thought content normal.           Significant Labs: All pertinent labs within the past 24 hours have been reviewed.  BMP:   Recent Labs   Lab 05/17/23  0440   *      K 4.4      CO2 21*   BUN 23   CREATININE 1.3   CALCIUM 8.7   MG 1.9     CBC:   Recent Labs   Lab 05/16/23 1958 05/17/23  0440   WBC 8.72 5.41   HGB 13.0 12.4   HCT 38.4 37.3    187       Significant Imaging: I have reviewed all pertinent imaging results/findings within the past 24 hours.

## 2023-05-17 NOTE — ASSESSMENT & PLAN NOTE
CT- No acute intracranial abnormalities identified  Hold medications that can alter mental status  CSF- elevated protein and glucose  Consult Neurology  IVF  Seen by ID, stopped abx, do not feel this is infectious  Neurology consulted:  Recommend MRI of the brain W W/O contrast to evaluate for any evidence of underlying inflammation to explain her elevated protein  For headache, Ibuprofen/Tylenol as needed + Mg oxide 400mg daily.

## 2023-05-17 NOTE — HPI
The patient is a 62 y.o. female with a past medical history of DM and HIV who presents with multiple complaints, including headaches with neck pain and photophobia. Patient recounts a two week history of difficulty with her equilibrium, leading to falls when walking or turning. She also has difficulty concentrating and loses track of sentences as she is trying to express ideas. Patient reports sometimes seeing things that are not there. She denies any chest pain, cough, shortness of breath, diarrhea, or vomiting.  Her daughter-in-law provides additional history. Patient is compliant with medication.  Patient also had a tooth extraction one week ago (May 9).  On initial workup, the patient is noted to have elevated protein and glucose in the CSF.  The patient also has a mild SATISH with a creatinine of 2.  She will be admitted for further management of her acute encephalopathy and acute kidney injury with Neurology consult.

## 2023-05-17 NOTE — ASSESSMENT & PLAN NOTE
Patient with acute kidney injury likely due to IVVD/dehydration SATISH is currently stable. Labs reviewed- Renal function/electrolytes with Estimated Creatinine Clearance: 42 mL/min (based on SCr of 1.3 mg/dL). according to latest data. Monitor urine output and serial BMP and adjust therapy as needed. Avoid nephrotoxins and renally dose meds for GFR listed above.

## 2023-05-17 NOTE — PLAN OF CARE
Patient AAOX3. Plan of care reviewed with patient and verbalized understanding. Pt remains free from fall, injury, and skin breakdown. Positions self independently uses a walker. Up to bedside commode and voiding spontaneously. IVFs infusing as ordered. Tolerating ordered diet. No complaints of N/V. Continuous cardiac monitoring initiated and  maintained. Contact and Droplet precautions initiated and maintained. Purposeful hourly rounding done. Safety maintained.      Problem: Adult Inpatient Plan of Care  Goal: Plan of Care Review  Outcome: Ongoing, Progressing     Problem: Diabetes Comorbidity  Goal: Blood Glucose Level Within Targeted Range  Outcome: Ongoing, Progressing     Problem: Fluid and Electrolyte Imbalance (Acute Kidney Injury/Impairment)  Goal: Fluid and Electrolyte Balance  Outcome: Ongoing, Progressing     Problem: Oral Intake Inadequate (Acute Kidney Injury/Impairment)  Goal: Optimal Nutrition Intake  Outcome: Ongoing, Progressing     Problem: Renal Function Impairment (Acute Kidney Injury/Impairment)  Goal: Effective Renal Function  Outcome: Ongoing, Progressing     Problem: Infection  Goal: Absence of Infection Signs and Symptoms  Outcome: Ongoing, Progressing

## 2023-05-17 NOTE — PLAN OF CARE
05/17/23 1705   Discharge Assessment   Assessment Type Discharge Planning Assessment   Confirmed/corrected address, phone number and insurance Yes   Confirmed Demographics Correct on Facesheet   Source of Information patient   People in Home alone   Do you expect to return to your current living situation? Yes   Do you have help at home or someone to help you manage your care at home? No   Prior to hospitilization cognitive status: Alert/Oriented   Current cognitive status: Alert/Oriented   Walking or Climbing Stairs ambulation difficulty, requires equipment;ambulation difficulty, assistance 1 person;stair climbing difficulty, assistance 1 person   Dressing/Bathing   (None)   Equipment Currently Used at Home walker, rolling   Readmission within 30 days? No   Patient currently being followed by outpatient case management? Yes   If yes, name of outpatient case management following: other (comments)  ([place] )   Do you currently have service(s) that help you manage your care at home? No   Do you take prescription medications? Yes   Do you have prescription coverage? Yes   Do you have any problems affording any of your prescribed medications? No   Is the patient taking medications as prescribed? yes   Who is going to help you get home at discharge? daughter in law   How do you get to doctors appointments? family or friend will provide   Are you on dialysis? No   Do you take coumadin? No   Discharge Plan A Home   Discharge Plan B Home Health   DME Needed Upon Discharge  none   Discharge Plan discussed with: Patient   Financial Resource Strain   How hard is it for you to pay for the very basics like food, housing, medical care, and heating? Not hard   Housing Stability   In the last 12 months, was there a time when you were not able to pay the mortgage or rent on time? N   In the last 12 months, how many places have you lived? 1   In the last 12 months, was there a time when you did not have a steady place  to sleep or slept in a shelter (including now)? N   Transportation Needs   In the past 12 months, has lack of transportation kept you from medical appointments or from getting medications? no   Food Insecurity   Within the past 12 months, you worried that your food would run out before you got the money to buy more. Never true   Within the past 12 months, the food you bought just didn't last and you didn't have money to get more. Never true   Stress   Do you feel stress - tense, restless, nervous, or anxious, or unable to sleep at night because your mind is troubled all the time - these days? To some exte   Social Connections   In a typical week, how many times do you talk on the phone with family, friends, or neighbors? More than 3   How often do you get together with friends or relatives?   (Monthly)   How often do you attend Caodaism or Restorationism services? More than 4   Do you belong to any clubs or organizations such as Caodaism groups, unions, fraternal or athletic groups, or school groups? No   How often do you attend meetings of the clubs or organizations you belong to? Never   Are you , , , , never , or living with a partner?    Alcohol Use   Q1: How often do you have a drink containing alcohol? 2-4 pr month   Q2: How many drinks containing alcohol do you have on a typical day when you are drinking? 1 or 2   Q3: How often do you have six or more drinks on one occasion? Never     Pt's daughter-in-law will pick her up at discharge.  Pt would like an [facility] PCP.

## 2023-05-17 NOTE — ED PROVIDER NOTES
SCRIBE #1 NOTE: I, Jan Julien, am scribing for, and in the presence of,  Meredith Rudd MD. I have scribed the entire note.       Source of History:  Patient    Chief complaint:  Headache (Extreme headache w/ light sensitivity over the past two days./)      HPI:  Jori Richmond is a 62 y.o. female presenting with multiple complaints, including headaches with neck pain and photophobia. Patient recounts a two week history of difficulty with her equilibrium, leading to falls when walking or turning. She also has difficulty concentrating and loses track of sentences as she is trying to express ideas. Patient reports sometimes seeing things that are not there. She denies any chest pain, cough, shortness of breath, diarrhea, or vomiting.    Her daughter-in-law provides additional history. Patient is compliant with medication. She has HIV with a recent CD4 count over 700 two months ago. Patient also had a tooth extraction one week ago (May 9).     This is the extent to the patients complaints today here in the emergency department.    ROS: As per HPI and below:  General: No fever.  No chills.  Eyes: Notes photophobia. No visual changes.  ENT: No sore throat. No ear pain  Head: No headache.    Respiratory: No shortness of breath.  Cardiovascular: No chest pain.  Abdomen: No abdominal pain.  No nausea or vomiting.  Genito-Urinary: No abnormal urination.  Neurologic: Notes dizziness and headaches. No focal weakness.  No numbness.  Psychiatric: Notes decreased concentration.  Notes hallucinations.  MSK: no back pain. Notes neck pain.  Integument: No rashes or lesions.  Hematologic: No easy bruising.  Endocrine: No excessive thirst or urination.    Review of patient's allergies indicates:   Allergen Reactions    Penicillins Swelling    Dapsone      G6PD deficiency  G6PD deficiency      Sulfamethoxazole-trimethoprim      G6PD deficiency  G6PD deficiency         PMH:  As per HPI and below:  Past Medical History:   Diagnosis  "Date    Diabetes mellitus     Digestive disorder      No past surgical history on file.         Physical Exam:    /80 (BP Location: Right arm, Patient Position: Lying)   Pulse 80   Temp 97.9 °F (36.6 °C) (Oral)   Resp 18   Ht 5' 6" (1.676 m)   Wt 70.7 kg (155 lb 13.8 oz)   SpO2 100%   Breastfeeding No   BMI 25.16 kg/m²   Nursing note and vital signs reviewed.    Appearance: No acute distress.  Eyes: No conjunctival injection.  Neck: No deformity. Posterior neck tenderness.  ENT: Oropharynx clear.  No stridor.   Chest/ Respiratory: Clear to auscultation bilaterally.  Good air movement.  No wheezes.  No rhonchi. No rales. No accessory muscle use.  Cardiovascular: Regular rate and rhythm.  No murmurs. No gallops. No rubs.  Abdomen: Soft.  Not distended.  No CVA tenderness.  Nontender.  No guarding.  No rebound. Non-peritoneal.  Musculoskeletal: Good range of motion all joints.  No deformities.  Neck supple.  No meningismus. No lower extremity edema.  Skin: No rashes seen.  Good turgor.  No abrasions.  No ecchymoses.  Neurologic: Motor intact.  Sensation intact.  Cerebellar intact.  Cranial nerves intact.  Mental Status:  Able to answer questions but sometimes repeats herself.  Appropriate, conversant.    Lumbar Puncture    Date/Time: 5/16/2023 9:12 PM  Location procedure was performed: [facility] EMERGENCY DEPARTMENT  Performed by: Meredith Rudd MD  Authorized by: Meredith Rudd MD   Consent Done: Yes  Indications: evaluation for altered mental status and evaluation for infection    Anesthesia:  Local Anesthetic: lidocaine 1% without epinephrine  Anesthetic total: 4 mL    Patient sedated: no  Preparation: Patient was prepped and draped in the usual sterile fashion.  Lumbar space: L4-L5 interspace  Patient's position: sitting  Needle gauge: 20  Needle type: spinal needle - Quincke tip  Needle length: 3.5 in  Number of attempts: 2  Fluid appearance: clear  Tubes of fluid: 4  Total volume: 8 ml  Post-procedure: " site cleaned, pressure dressing applied and adhesive bandage applied  Patient tolerance: Patient tolerated the procedure well with no immediate complications      Critical Care    Date/Time: 5/16/2023 9:13 PM  Performed by: Meredith Rudd MD  Authorized by: Meredith Rudd MD   Total critical care time (exclusive of procedural time) : 0 minutes  Critical care time was exclusive of separately billable procedures and treating other patients and teaching time.  Critical care was necessary to treat or prevent imminent or life-threatening deterioration of the following conditions: sepsis.  Critical care was time spent personally by me on the following activities: blood draw for specimens, development of treatment plan with patient or surrogate, discussions with consultants, interpretation of cardiac output measurements, evaluation of patient's response to treatment, examination of patient, obtaining history from patient or surrogate, ordering and performing treatments and interventions, ordering and review of laboratory studies, ordering and review of radiographic studies, pulse oximetry, re-evaluation of patient's condition and review of old charts.          I decided to obtain the patient's medical records.  Summary of Medical Records:  See HPI    EKG:  I independently reviewed and interpreted the EKG and my findings are as follows:   Sinus tachycardia. Rate of 104. No acute ST changes. Normal intervals.     CXR Interpretation:  CXR independently interpreted by myself shows normal heart size. No infiltrate. No bony deformity. No acute disease.     Labs:  Results for orders placed or performed during the hospital encounter of 05/16/23   Blood culture x two cultures. Draw prior to antibiotics.    Specimen: Peripheral, Forearm, Left; Blood   Result Value Ref Range    Blood Culture, Routine No Growth to date     Blood Culture, Routine No Growth to date    Blood culture x two cultures. Draw prior to antibiotics.    Specimen:  Peripheral, Antecubital, Right; Blood   Result Value Ref Range    Blood Culture, Routine No Growth to date     Blood Culture, Routine No Growth to date    CSF culture and Gram Stain (Tube 2)    Specimen: CSF Tap, Tube 2; CSF (Spinal Fluid)   Result Value Ref Range    CSF CULTURE No Growth to date     Gram Stain Result No WBC's     Gram Stain Result No organisms seen    Urine culture    Specimen: Urine   Result Value Ref Range    Urine Culture, Routine (A)      COAGULASE-NEGATIVE STAPHYLOCOCCUS SPECIES  > 100,000 cfu/ml  Susceptibility testing not routinely performed.     CBC auto differential   Result Value Ref Range    WBC 8.72 3.90 - 12.70 K/uL    RBC 3.85 (L) 4.00 - 5.40 M/uL    Hemoglobin 13.0 12.0 - 16.0 g/dL    Hematocrit 38.4 37.0 - 48.5 %     (H) 82 - 98 fL    MCH 33.8 (H) 27.0 - 31.0 pg    MCHC 33.9 32.0 - 36.0 g/dL    RDW 11.3 (L) 11.5 - 14.5 %    Platelets 224 150 - 450 K/uL    MPV 9.3 9.2 - 12.9 fL    Immature Granulocytes 0.3 0.0 - 0.5 %    Gran # (ANC) 4.6 1.8 - 7.7 K/uL    Immature Grans (Abs) 0.03 0.00 - 0.04 K/uL    Lymph # 3.2 1.0 - 4.8 K/uL    Mono # 0.7 0.3 - 1.0 K/uL    Eos # 0.1 0.0 - 0.5 K/uL    Baso # 0.07 0.00 - 0.20 K/uL    nRBC 0 0 /100 WBC    Gran % 52.9 38.0 - 73.0 %    Lymph % 36.6 18.0 - 48.0 %    Mono % 8.3 4.0 - 15.0 %    Eosinophil % 1.1 0.0 - 8.0 %    Basophil % 0.8 0.0 - 1.9 %    Differential Method Automated    Comprehensive metabolic panel   Result Value Ref Range    Sodium 139 136 - 145 mmol/L    Potassium 4.0 3.5 - 5.1 mmol/L    Chloride 104 95 - 110 mmol/L    CO2 24 23 - 29 mmol/L    Glucose 173 (H) 70 - 110 mg/dL    BUN 30 (H) 8 - 23 mg/dL    Creatinine 2.0 (H) 0.5 - 1.4 mg/dL    Calcium 9.7 8.7 - 10.5 mg/dL    Total Protein 8.5 (H) 6.0 - 8.4 g/dL    Albumin 4.1 3.5 - 5.2 g/dL    Total Bilirubin 0.4 0.1 - 1.0 mg/dL    Alkaline Phosphatase 100 55 - 135 U/L    AST 49 (H) 10 - 40 U/L    ALT 41 10 - 44 U/L    Anion Gap 11 8 - 16 mmol/L    eGFR 28 (A) >60 mL/min/1.73 m^2    Urinalysis, Reflex to Urine Culture Urine, Clean Catch    Specimen: Urine   Result Value Ref Range    Specimen UA Urine, Clean Catch     Color, UA Yellow Yellow, Straw, Sari    Appearance, UA Hazy (A) Clear    pH, UA 7.0 5.0 - 8.0    Specific Gravity, UA 1.015 1.005 - 1.030    Protein, UA Negative Negative    Glucose, UA 1+ (A) Negative    Ketones, UA Negative Negative    Bilirubin (UA) Negative Negative    Occult Blood UA Negative Negative    Nitrite, UA Positive (A) Negative    Urobilinogen, UA Negative <2.0 EU/dL    Leukocytes, UA 3+ (A) Negative   Procalcitonin   Result Value Ref Range    Procalcitonin 0.06 <0.25 ng/mL   Toxicology screen, urine   Result Value Ref Range    Alcohol, Urine <10 <10 mg/dL    Benzodiazepines Negative Negatiive    Methadone metabolites Negative Negative    Cocaine (Metab.) Negative Negative    Opiate Scrn, Ur Negative Negative    Barbiturate Screen, Ur Negative Negative    Amphetamine Screen, Ur Negative Negative    THC Presumptive Positive (A) Negative    Phencyclidine Negative Negative    Creatinine, Urine 79.3 15.0 - 325.0 mg/dL    Toxicology Information SEE COMMENT    Glucose, CSF (Tube 1)   Result Value Ref Range    CSF Tube Number 1     Glucose,  (H) 40 - 70 mg/dL   Protein, CSF (Tube 1)   Result Value Ref Range    CSF Tube Number 1     Protein, CSF 90 (H) 15 - 40 mg/dL   CSF cell count with differential (Tube 4)   Result Value Ref Range    Heme Aliquot 7.0 mL    Appearance, CSF Clear Clear    Color, CSF Colorless Colorless    WBC, CSF 0 0 - 5 /cu mm    RBC, CSF 6 (A) 0 /cu mm    CSF, Comment no diff performed. no wbcs counted    Ethanol   Result Value Ref Range    Alcohol, Serum <10 <10 mg/dL   Urinalysis Microscopic   Result Value Ref Range    RBC, UA 2 0 - 4 /hpf    WBC, UA 69 (H) 0 - 5 /hpf    Bacteria Rare None-Occ /hpf    Yeast, UA Few (A) None    Squam Epithel, UA 6 /hpf    Microscopic Comment SEE COMMENT    Lactic acid, plasma #2   Result Value Ref Range     Lactate (Lactic Acid) 1.8 0.5 - 2.2 mmol/L   Comprehensive Metabolic Panel (CMP)   Result Value Ref Range    Sodium 138 136 - 145 mmol/L    Potassium 4.4 3.5 - 5.1 mmol/L    Chloride 108 95 - 110 mmol/L    CO2 21 (L) 23 - 29 mmol/L    Glucose 266 (H) 70 - 110 mg/dL    BUN 23 8 - 23 mg/dL    Creatinine 1.3 0.5 - 1.4 mg/dL    Calcium 8.7 8.7 - 10.5 mg/dL    Total Protein 7.6 6.0 - 8.4 g/dL    Albumin 3.7 3.5 - 5.2 g/dL    Total Bilirubin 0.3 0.1 - 1.0 mg/dL    Alkaline Phosphatase 85 55 - 135 U/L    AST 36 10 - 40 U/L    ALT 36 10 - 44 U/L    Anion Gap 9 8 - 16 mmol/L    eGFR 46 (A) >60 mL/min/1.73 m^2   Magnesium   Result Value Ref Range    Magnesium 1.9 1.6 - 2.6 mg/dL   Phosphorus   Result Value Ref Range    Phosphorus 3.3 2.7 - 4.5 mg/dL   CBC with Automated Differential   Result Value Ref Range    WBC 5.41 3.90 - 12.70 K/uL    RBC 3.72 (L) 4.00 - 5.40 M/uL    Hemoglobin 12.4 12.0 - 16.0 g/dL    Hematocrit 37.3 37.0 - 48.5 %     (H) 82 - 98 fL    MCH 33.3 (H) 27.0 - 31.0 pg    MCHC 33.2 32.0 - 36.0 g/dL    RDW 11.2 (L) 11.5 - 14.5 %    Platelets 187 150 - 450 K/uL    MPV 9.6 9.2 - 12.9 fL    Immature Granulocytes 0.4 0.0 - 0.5 %    Gran # (ANC) 2.9 1.8 - 7.7 K/uL    Immature Grans (Abs) 0.02 0.00 - 0.04 K/uL    Lymph # 1.9 1.0 - 4.8 K/uL    Mono # 0.5 0.3 - 1.0 K/uL    Eos # 0.1 0.0 - 0.5 K/uL    Baso # 0.04 0.00 - 0.20 K/uL    nRBC 0 0 /100 WBC    Gran % 54.0 38.0 - 73.0 %    Lymph % 35.1 18.0 - 48.0 %    Mono % 8.5 4.0 - 15.0 %    Eosinophil % 1.3 0.0 - 8.0 %    Basophil % 0.7 0.0 - 1.9 %    Differential Method Automated    Hemoglobin A1c   Result Value Ref Range    Hemoglobin A1C 10.5 (H) 4.0 - 5.6 %    Estimated Avg Glucose 255 (H) 68 - 131 mg/dL   Vancomycin, random   Result Value Ref Range    Vancomycin, Random 8.0 Not established ug/mL   Comprehensive Metabolic Panel (CMP)   Result Value Ref Range    Sodium 135 (L) 136 - 145 mmol/L    Potassium 4.6 3.5 - 5.1 mmol/L    Chloride 106 95 - 110 mmol/L     CO2 20 (L) 23 - 29 mmol/L    Glucose 262 (H) 70 - 110 mg/dL    BUN 17 8 - 23 mg/dL    Creatinine 1.2 0.5 - 1.4 mg/dL    Calcium 9.1 8.7 - 10.5 mg/dL    Total Protein 7.9 6.0 - 8.4 g/dL    Albumin 3.7 3.5 - 5.2 g/dL    Total Bilirubin 0.3 0.1 - 1.0 mg/dL    Alkaline Phosphatase 93 55 - 135 U/L    AST 32 10 - 40 U/L    ALT 34 10 - 44 U/L    Anion Gap 9 8 - 16 mmol/L    eGFR 51 (A) >60 mL/min/1.73 m^2   Magnesium   Result Value Ref Range    Magnesium 2.0 1.6 - 2.6 mg/dL   Phosphorus   Result Value Ref Range    Phosphorus 3.3 2.7 - 4.5 mg/dL   CBC with Automated Differential   Result Value Ref Range    WBC 5.64 3.90 - 12.70 K/uL    RBC 3.72 (L) 4.00 - 5.40 M/uL    Hemoglobin 12.3 12.0 - 16.0 g/dL    Hematocrit 37.3 37.0 - 48.5 %     (H) 82 - 98 fL    MCH 33.1 (H) 27.0 - 31.0 pg    MCHC 33.0 32.0 - 36.0 g/dL    RDW 11.2 (L) 11.5 - 14.5 %    Platelets 194 150 - 450 K/uL    MPV 9.5 9.2 - 12.9 fL    Immature Granulocytes 0.2 0.0 - 0.5 %    Gran # (ANC) 2.4 1.8 - 7.7 K/uL    Immature Grans (Abs) 0.01 0.00 - 0.04 K/uL    Lymph # 2.7 1.0 - 4.8 K/uL    Mono # 0.4 0.3 - 1.0 K/uL    Eos # 0.1 0.0 - 0.5 K/uL    Baso # 0.03 0.00 - 0.20 K/uL    nRBC 0 0 /100 WBC    Gran % 42.8 38.0 - 73.0 %    Lymph % 47.3 18.0 - 48.0 %    Mono % 7.6 4.0 - 15.0 %    Eosinophil % 1.6 0.0 - 8.0 %    Basophil % 0.5 0.0 - 1.9 %    Differential Method Automated    POCT COVID-19 Rapid Screening   Result Value Ref Range    POC Rapid COVID Negative Negative     Acceptable Yes    POCT glucose   Result Value Ref Range    POCT Glucose 144 (H) 70 - 110 mg/dL   ISTAT CREATININE   Result Value Ref Range    POC Creatinine 1.8 (H) 0.5 - 1.4 mg/dL    Sample VENOUS    ISTAT Lactate   Result Value Ref Range    POC Lactate 2.25 (H) 0.5 - 2.2 mmol/L    Sample VENOUS     FiO2 21     POC Temp 37.0 C    POCT glucose   Result Value Ref Range    POCT Glucose 233 (H) 70 - 110 mg/dL   POCT glucose   Result Value Ref Range    POCT Glucose 279 (H) 70 - 110  mg/dL   POCT glucose   Result Value Ref Range    POCT Glucose 307 (H) 70 - 110 mg/dL   POCT glucose   Result Value Ref Range    POCT Glucose 281 (H) 70 - 110 mg/dL   POCT glucose   Result Value Ref Range    POCT Glucose 342 (H) 70 - 110 mg/dL   POCT glucose   Result Value Ref Range    POCT Glucose 256 (H) 70 - 110 mg/dL         Initial Impression  62 y.o. female with multiiple complaints and appears to have worsening confusion, now with headache and fever. Also with multiple recent falls. Plan sepsis workup, CT brain, anticipate LP, broad spectrum IV antibiotics, fluids, isolation, and admission.  With AMS - concern for meningitis or encephalopathy    Differential Dx:  Sepsis, bacteremia, UTI, pneumonia, cellulitis, abscess, indwelling line/catheter infection, cholecystitis, viral URI, gastroenteritis, viral syndrome, sinusitis, otitis media/externa, neoplasm, drug reaction, serotonin syndrome, intoxication/withdrawal syndrome.      MDM:    I discussed the patient's presentation, findings and response to treatment in the ED with the hospitalist on call who will admit for further treatment and evaluation.              Scribe Attestation:   Scribe #1: I performed the above scribed service and the documentation accurately describes the services I performed. I attest to the accuracy of the note.  Physician Attestation for Scribe: I, Meredith Rudd MD, reviewed documentation as scribed in my presence, which is both accurate and complete.      Diagnostic Impression:    1. Acute encephalopathy    2. Fever    3. AMS (altered mental status)    4. SATISH (acute kidney injury)    5. Type 2 diabetes mellitus with hyperglycemia, with long-term current use of insulin    6. HIV disease         ED Disposition Condition    Observation Stable                  Meredith Rudd MD  05/18/23 [id number]

## 2023-05-17 NOTE — ASSESSMENT & PLAN NOTE
- patient is not encephalopathic this am  - suspect MS or Neuro (migraine?)  - CSF is without WBCs and patient is not immunocompromised by CD4  - would stop abx and pursue non-infectious etiologies

## 2023-05-17 NOTE — H&P
[address] Medicine  History & Physical    Patient Name: Jori Richmond  MRN: [id number]  Patient Class: OP- Observation  Admission Date: 5/16/2023  Attending Physician: Venus Fleming MD   Primary Care Provider: Primary Doctor No         Patient information was obtained from patient, past medical records and ER records.     Subjective:     Principal Problem:Acute encephalopathy    Chief Complaint:   Chief Complaint   Patient presents with    Headache     Extreme headache w/ light sensitivity over the past two days.          HPI: The patient is a 62 y.o. female with a past medical history of DM and HIV who presents with multiple complaints, including headaches with neck pain and photophobia. Patient recounts a two week history of difficulty with her equilibrium, leading to falls when walking or turning. She also has difficulty concentrating and loses track of sentences as she is trying to express ideas. Patient reports sometimes seeing things that are not there. She denies any chest pain, cough, shortness of breath, diarrhea, or vomiting.  Her daughter-in-law provides additional history. Patient is compliant with medication.  Patient also had a tooth extraction one week ago (May 9).  On initial workup, the patient is noted to have elevated protein and glucose in the CSF.  The patient also has a mild SATISH with a creatinine of 2.  She will be admitted for further management of her acute encephalopathy and acute kidney injury with Neurology consult.      Past Medical History:   Diagnosis Date    Diabetes mellitus     Digestive disorder        No past surgical history on file.    Review of patient's allergies indicates:   Allergen Reactions    Penicillins Swelling    Dapsone      G6PD deficiency  G6PD deficiency      Sulfamethoxazole-trimethoprim      G6PD deficiency  G6PD deficiency         No current facility-administered medications on file prior to encounter.     Current  Outpatient Medications on File Prior to Encounter   Medication Sig    dulaglutide (TRULICITY) 1.5 mg/0.5 mL pen injector Inject 1.5 mg into the skin.    elviteg-cob-emtri-tenof ALAFEN (GENVOYA) [phone] mg Tab Take 1 tablet by mouth.    insulin (LANTUS SOLOSTAR U-100 INSULIN) glargine 100 units/mL SubQ pen Inject 30 Units into the skin.    insulin lispro (HUMALOG KWIKPEN INSULIN) 100 unit/mL pen Inject 22 Units into the skin.    buPROPion (WELLBUTRIN XL) 150 MG TB24 tablet Take 150 mg by mouth every morning.    famotidine (PEPCID) 40 MG tablet Take 40 mg by mouth.    gabapentin (NEURONTIN) 800 MG tablet Take 800 mg by mouth 3 (three) times daily.    hydrOXYzine pamoate (VISTARIL) 25 MG Cap Take 25 mg by mouth 3 (three) times daily.    pravastatin (PRAVACHOL) 40 MG tablet Take 40 mg by mouth.     Family History       Family history is unknown by patient.          Tobacco Use    Smoking status: Not on file    Smokeless tobacco: Not on file   Substance and Sexual Activity    Alcohol use: Not on file    Drug use: Not on file    Sexual activity: Not on file     Review of Systems   Constitutional:  Negative for activity change, appetite change and fever.   HENT:  Negative for congestion, ear pain, rhinorrhea and sinus pressure.    Eyes:  Negative for pain and discharge.   Respiratory:  Negative for cough, chest tightness, shortness of breath and wheezing.    Cardiovascular:  Negative for chest pain and leg swelling.   Gastrointestinal:  Negative for abdominal distention, abdominal pain, diarrhea, nausea and vomiting.   Endocrine: Negative for cold intolerance and heat intolerance.   Genitourinary:  Negative for difficulty urinating, flank pain, frequency, hematuria and urgency.   Musculoskeletal:  Negative for arthralgias, joint swelling and myalgias.   Allergic/Immunologic: Negative for environmental allergies and food allergies.   Neurological:  Positive for dizziness, weakness and headaches.  Negative for light-headedness.   Hematological:  Does not bruise/bleed easily.   Psychiatric/Behavioral:  Negative for agitation, behavioral problems and decreased concentration.    Objective:     Vital Signs (Most Recent):  Temp: 97.6 °F (36.4 °C) (05/16/23 2247)  Pulse: 80 (05/16/23 2247)  Resp: 18 (05/16/23 2247)  BP: 123/62 (05/16/23 2247)  SpO2: 96 % (05/16/23 2247) Vital Signs (24h Range):  Temp:  [97.6 °F (36.4 °C)-100.5 °F (38.1 °C)] 97.6 °F (36.4 °C)  Pulse:  [80-96] 80  Resp:  [17-64] 18  SpO2:  [94 %-98 %] 96 %  BP: (123-142)/(62-77) 123/62     Weight: 65.8 kg (145 lb)  Body mass index is 23.4 kg/m².     Physical Exam  Constitutional:       Appearance: Normal appearance. She is well-developed.   HENT:      Head: Normocephalic.   Eyes:      General:         Right eye: No discharge.         Left eye: No discharge.      Conjunctiva/sclera: Conjunctivae normal.   Cardiovascular:      Rate and Rhythm: Normal rate and regular rhythm.      Pulses:           Radial pulses are 2+ on the right side and 2+ on the left side.      Heart sounds: Normal heart sounds.   Pulmonary:      Effort: Pulmonary effort is normal. No respiratory distress.      Breath sounds: Normal breath sounds.   Abdominal:      General: Bowel sounds are normal. There is no distension.      Palpations: Abdomen is soft.      Tenderness: There is no abdominal tenderness.   Musculoskeletal:         General: Normal range of motion.      Cervical back: Normal range of motion and neck supple.   Skin:     General: Skin is warm and dry.   Neurological:      Mental Status: She is alert and oriented to person, place, and time.      GCS: GCS eye subscore is 4. GCS verbal subscore is 5. GCS motor subscore is 6.      Motor: Motor function is intact.   Psychiatric:         Mood and Affect: Mood normal.         Speech: Speech normal.         Behavior: Behavior normal.         Thought Content: Thought content normal.              Significant Labs: All pertinent  labs within the past 24 hours have been reviewed.  CBC:   Recent Labs   Lab 05/16/23 1958   WBC 8.72   HGB 13.0   HCT 38.4        CMP:   Recent Labs   Lab 05/16/23 1958      K 4.0      CO2 24   *   BUN 30*   CREATININE 2.0*   CALCIUM 9.7   PROT 8.5*   ALBUMIN 4.1   BILITOT 0.4   ALKPHOS 100   AST 49*   ALT 41   ANIONGAP 11       Significant Imaging: I have reviewed all pertinent imaging results/findings within the past 24 hours.    Assessment/Plan:     * Acute encephalopathy  CT- No acute intracranial abnormalities identified    Hold medications that can alter mental status  CSF- elevated protein and glucose  Consult Neurology  IVF  Rocephin/Vanc with fevers  Maintain isolation      HIV disease  This patient in known to have HIV+ status (Have detected HIV PCR but never CD4 <200 or AIDS defining illness). Labs reviewed- CD4 greater than 700 in March.  Patient is on HAART. Will continue HAART. Prophylaxis was not indicated at this time- . Continue to monitor routine labs.     We will not consult Infectious disease at this time. Other HIV-associated diseases are not present.        SATISH (acute kidney injury)  Patient with acute kidney injury likely due to IVVD/dehydration SATISH is currently stable. Labs reviewed- Renal function/electrolytes with Estimated Creatinine Clearance: 27.3 mL/min (A) (based on SCr of 2 mg/dL (H)). according to latest data. Monitor urine output and serial BMP and adjust therapy as needed. Avoid nephrotoxins and renally dose meds for GFR listed above.       Type 2 diabetes mellitus with hyperglycemia, with long-term current use of insulin  Patient's FSGs are uncontrolled due to hyperglycemia on current medication regimen.  Last A1c reviewed-   Lab Results   Component Value Date    HGBA1C 11.2 (H) 10/11/2021     Most recent fingerstick glucose reviewed-   Recent Labs   Lab 05/16/23 1925   POCTGLUCOSE 144*     Current correctional scale  Medium  Maintain  anti-hyperglycemic dose as follows-   Antihyperglycemics (From admission, onward)    Start     Stop Route Frequency Ordered    05/17/23 0900  insulin detemir U-100 (Levemir) pen 15 Units         -- SubQ Daily 05/16/23 2203 05/16/23 2302  insulin aspart U-100 pen 1-10 Units         -- SubQ Before meals & nightly PRN 05/16/23 2203        Hold Oral hypoglycemics while patient is in the hospital.      VTE Risk Mitigation (From admission, onward)         Ordered     heparin (porcine) injection 5,000 Units  Every 8 hours         05/16/23 2203     IP VTE LOW RISK PATIENT  Once         05/16/23 2203     Place sequential compression device  Until discontinued         05/16/23 2203                     Ashvin Paula NP  Department of Hospital Medicine  [facility] ([address])

## 2023-05-17 NOTE — FIRST PROVIDER EVALUATION
Emergency Department TeleTriage Encounter Note      CHIEF COMPLAINT    Chief Complaint   Patient presents with    Headache     Extreme headache w/ light sensitivity over the past two days.         VITAL SIGNS   Initial Vitals [05/16/23 1920]   BP Pulse Resp Temp SpO2   (!) 142/76 89 17 (!) 100.5 °F (38.1 °C) 97 %      MAP       --            ALLERGIES    Review of patient's allergies indicates:   Allergen Reactions    Penicillins Swelling    Dapsone      G6PD deficiency  G6PD deficiency      Sulfamethoxazole-trimethoprim      G6PD deficiency  G6PD deficiency         PROVIDER TRIAGE NOTE  TeleTriage Note: Jori Richmond, a nontoxic/well appearing, 62 y.o. female, presented to the ED with c/o fatigue, fever, can't walk (every time she tries to walk, she falls down), headaches, back pain and multiple other complaints. Pt can't tell me how long she has felt bad. She is rambling is isn't making sense and then starts crying. Charge nurse notified of concerns.     All ED beds are full at present; patient notified of this status.  Patient seen and medically screened by Nurse Practitioner via teletriage. Orders initiated at triage to expedite care. Pt is stable but needs to be roomed as a priority. Care will be transferred to an alternate provider when patient has been placed in an Exam Room from the [facility] for physical exam, additional orders, and disposition.  7:34 PM Yocasta Paul DNP, FNP-C        ORDERS  Labs Reviewed   POCT GLUCOSE - Abnormal; Notable for the following components:       Result Value    POCT Glucose 144 (*)     All other components within normal limits       ED Orders (720h ago, onward)      Start Ordered     Status Ordering Provider    05/16/23 1925 05/16/23 1925  POCT glucose  Once         Final result EMERGENCY, DEPT PHYSICIAN              Virtual Visit Note: The provider triage portion of this emergency department evaluation and documentation was performed via [employer], a HIPAA-compliant  telemedicine application, in concert with a tele-presenter in the room. A face to face patient evaluation with one of my colleagues will occur once the patient is placed in an emergency department room.      DISCLAIMER: This note was prepared with [email or web address] voice recognition transcription software. Garbled syntax, mangled pronouns, and other bizarre constructions may be attributed to that software system.

## 2023-05-17 NOTE — ASSESSMENT & PLAN NOTE
Patient's FSGs are uncontrolled due to hyperglycemia on current medication regimen.  Last A1c reviewed-   Lab Results   Component Value Date    HGBA1C 10.5 (H) 05/17/2023     Most recent fingerstick glucose reviewed-   Recent Labs   Lab 05/16/23  1925 05/17/23  0743 05/17/23  1134   POCTGLUCOSE 144* 233* 279*     Current correctional scale  Medium  Maintain anti-hyperglycemic dose as follows-   Antihyperglycemics (From admission, onward)    Start     Stop Route Frequency Ordered    05/17/23 0900  insulin detemir U-100 (Levemir) pen 15 Units         -- SubQ Daily 05/16/23 2203 05/16/23 2302  insulin aspart U-100 pen 1-10 Units         -- SubQ Before meals & nightly PRN 05/16/23 2203        Hold Oral hypoglycemics while patient is in the hospital.

## 2023-05-17 NOTE — ASSESSMENT & PLAN NOTE
CT- No acute intracranial abnormalities identified    Hold medications that can alter mental status  CSF- elevated protein and glucose  Consult Neurology  IVF  Rocephin/Vanc with fevers  Maintain isolation

## 2023-05-17 NOTE — SUBJECTIVE & OBJECTIVE
Past Medical History:   Diagnosis Date    Diabetes mellitus     Digestive disorder        No past surgical history on file.    Review of patient's allergies indicates:   Allergen Reactions    Penicillins Swelling    Dapsone      G6PD deficiency  G6PD deficiency      Sulfamethoxazole-trimethoprim      G6PD deficiency  G6PD deficiency         No current facility-administered medications on file prior to encounter.     Current Outpatient Medications on File Prior to Encounter   Medication Sig    dulaglutide (TRULICITY) 1.5 mg/0.5 mL pen injector Inject 1.5 mg into the skin.    elviteg-cob-emtri-tenof ALAFEN (GENVOYA) [phone] mg Tab Take 1 tablet by mouth.    insulin (LANTUS SOLOSTAR U-100 INSULIN) glargine 100 units/mL SubQ pen Inject 30 Units into the skin.    insulin lispro (HUMALOG KWIKPEN INSULIN) 100 unit/mL pen Inject 22 Units into the skin.    buPROPion (WELLBUTRIN XL) 150 MG TB24 tablet Take 150 mg by mouth every morning.    famotidine (PEPCID) 40 MG tablet Take 40 mg by mouth.    gabapentin (NEURONTIN) 800 MG tablet Take 800 mg by mouth 3 (three) times daily.    hydrOXYzine pamoate (VISTARIL) 25 MG Cap Take 25 mg by mouth 3 (three) times daily.    pravastatin (PRAVACHOL) 40 MG tablet Take 40 mg by mouth.     Family History       Family history is unknown by patient.          Tobacco Use    Smoking status: Not on file    Smokeless tobacco: Not on file   Substance and Sexual Activity    Alcohol use: Not on file    Drug use: Not on file    Sexual activity: Not on file     Review of Systems   Constitutional:  Negative for activity change, appetite change and fever.   HENT:  Negative for congestion, ear pain, rhinorrhea and sinus pressure.    Eyes:  Negative for pain and discharge.   Respiratory:  Negative for cough, chest tightness, shortness of breath and wheezing.    Cardiovascular:  Negative for chest pain and leg swelling.   Gastrointestinal:  Negative for abdominal distention, abdominal pain, diarrhea,  nausea and vomiting.   Endocrine: Negative for cold intolerance and heat intolerance.   Genitourinary:  Negative for difficulty urinating, flank pain, frequency, hematuria and urgency.   Musculoskeletal:  Negative for arthralgias, joint swelling and myalgias.   Allergic/Immunologic: Negative for environmental allergies and food allergies.   Neurological:  Positive for dizziness, weakness and headaches. Negative for light-headedness.   Hematological:  Does not bruise/bleed easily.   Psychiatric/Behavioral:  Negative for agitation, behavioral problems and decreased concentration.    Objective:     Vital Signs (Most Recent):  Temp: 97.6 °F (36.4 °C) (05/16/23 2247)  Pulse: 80 (05/16/23 2247)  Resp: 18 (05/16/23 2247)  BP: 123/62 (05/16/23 2247)  SpO2: 96 % (05/16/23 2247) Vital Signs (24h Range):  Temp:  [97.6 °F (36.4 °C)-100.5 °F (38.1 °C)] 97.6 °F (36.4 °C)  Pulse:  [80-96] 80  Resp:  [17-64] 18  SpO2:  [94 %-98 %] 96 %  BP: (123-142)/(62-77) 123/62     Weight: 65.8 kg (145 lb)  Body mass index is 23.4 kg/m².     Physical Exam  Constitutional:       Appearance: Normal appearance. She is well-developed.   HENT:      Head: Normocephalic.   Eyes:      General:         Right eye: No discharge.         Left eye: No discharge.      Conjunctiva/sclera: Conjunctivae normal.   Cardiovascular:      Rate and Rhythm: Normal rate and regular rhythm.      Pulses:           Radial pulses are 2+ on the right side and 2+ on the left side.      Heart sounds: Normal heart sounds.   Pulmonary:      Effort: Pulmonary effort is normal. No respiratory distress.      Breath sounds: Normal breath sounds.   Abdominal:      General: Bowel sounds are normal. There is no distension.      Palpations: Abdomen is soft.      Tenderness: There is no abdominal tenderness.   Musculoskeletal:         General: Normal range of motion.      Cervical back: Normal range of motion and neck supple.   Skin:     General: Skin is warm and dry.   Neurological:       Mental Status: She is alert and oriented to person, place, and time.      GCS: GCS eye subscore is 4. GCS verbal subscore is 5. GCS motor subscore is 6.      Motor: Motor function is intact.   Psychiatric:         Mood and Affect: Mood normal.         Speech: Speech normal.         Behavior: Behavior normal.         Thought Content: Thought content normal.              Significant Labs: All pertinent labs within the past 24 hours have been reviewed.  CBC:   Recent Labs   Lab 05/16/23 1958   WBC 8.72   HGB 13.0   HCT 38.4        CMP:   Recent Labs   Lab 05/16/23 1958      K 4.0      CO2 24   *   BUN 30*   CREATININE 2.0*   CALCIUM 9.7   PROT 8.5*   ALBUMIN 4.1   BILITOT 0.4   ALKPHOS 100   AST 49*   ALT 41   ANIONGAP 11       Significant Imaging: I have reviewed all pertinent imaging results/findings within the past 24 hours.

## 2023-05-17 NOTE — ASSESSMENT & PLAN NOTE
Patient with acute kidney injury likely due to IVVD/dehydration SATISH is currently stable. Labs reviewed- Renal function/electrolytes with Estimated Creatinine Clearance: 27.3 mL/min (A) (based on SCr of 2 mg/dL (H)). according to latest data. Monitor urine output and serial BMP and adjust therapy as needed. Avoid nephrotoxins and renally dose meds for GFR listed above.

## 2023-05-17 NOTE — PROGRESS NOTES
Pharmacokinetic Initial Assessment: IV Vancomycin    Assessment/Plan:    Initiate intravenous vancomycin with loading dose of 2000 mg once with subsequent doses when random concentrations are less than 20 mcg/mL  Desired empiric serum trough concentration is 15 to 20 mcg/mL  Draw vancomycin random level on 5/17/23 at 2200.  Pharmacy will continue to follow and monitor vancomycin.      Please contact pharmacy at extension [phone] with any questions regarding this assessment.     Thank you for the consult,   Wesley Craft       Patient brief summary:  Jori Richmond is a 62 y.o. female initiated on antimicrobial therapy with IV Vancomycin for treatment of suspected  Encephalitis    Drug Allergies:   Review of patient's allergies indicates:   Allergen Reactions    Penicillins Swelling    Dapsone      G6PD deficiency  G6PD deficiency      Sulfamethoxazole-trimethoprim      G6PD deficiency  G6PD deficiency         Actual Body Weight:   70.7 kg    Renal Function:   Estimated Creatinine Clearance: 27.3 mL/min (A) (based on SCr of 2 mg/dL (H)).,     Dialysis Method (if applicable):  N/A    CBC (last 72 hours):  Recent Labs   Lab Result Units 05/16/23 1958   WBC K/uL 8.72   Hemoglobin g/dL 13.0   Hematocrit % 38.4   Platelets K/uL 224   Gran % % 52.9   Lymph % % 36.6   Mono % % 8.3   Eosinophil % % 1.1   Basophil % % 0.8   Differential Method  Automated       Metabolic Panel (last 72 hours):  Recent Labs   Lab Result Units 05/16/23 1958 05/16/23 2120 05/16/23 2146   Sodium mmol/L 139  --   --    Potassium mmol/L 4.0  --   --    Chloride mmol/L 104  --   --    CO2 mmol/L 24  --   --    Glucose mg/dL 173*  --   --    Glucose, CSF mg/dL  --  135*  --    Glucose, UA   --   --  1+*   BUN mg/dL 30*  --   --    Creatinine mg/dL 2.0*  --   --    Creatinine, Urine mg/dL  --   --  79.3   Albumin g/dL 4.1  --   --    Total Bilirubin mg/dL 0.4  --   --    Alkaline Phosphatase U/L 100  --   --    AST U/L 49*  --   --    ALT U/L  41  --   --        Drug levels (last 3 results):  No results for input(s): VANCOMYCINRA, VANCORANDOM, VANCOMYCINPE, VANCOPEAK, VANCOMYCINTR, VANCOTROUGH in the last 72 hours.    Microbiologic Results:  Microbiology Results (last 7 days)       Procedure Component Value Units Date/Time    CSF culture and Gram Stain (Tube 2) [[id number]] Collected: 05/16/23 2122    Order Status: Completed Specimen: CSF (Spinal Fluid) from CSF Tap, Tube 2 Updated: 05/17/23 0158     Gram Stain Result No WBC's      No organisms seen    Narrative:      On which sequentially labeled tube should this analysis be  performed?->2    Urine culture [[id number]] Collected: 05/16/23 2146    Order Status: No result Specimen: Urine Updated: 05/16/23 2207    Blood culture x two cultures. Draw prior to antibiotics. [[id number]] Collected: 05/16/23 2006    Order Status: Sent Specimen: Blood from Peripheral, Forearm, Left Updated: 05/16/23 2007    Blood culture x two cultures. Draw prior to antibiotics. [[id number]] Collected: 05/16/23 2000    Order Status: Sent Specimen: Blood from Peripheral, Antecubital, Right Updated: 05/16/23 2000

## 2023-05-17 NOTE — ASSESSMENT & PLAN NOTE
- followed by [facility] at [facility] (Dr. Pereira)  - on Genvoya  - last CD4 731(3/23)  - continue same  - f/u Dr. Pereira after discharge

## 2023-05-17 NOTE — SUBJECTIVE & OBJECTIVE
Past Medical History:   Diagnosis Date    Diabetes mellitus     Digestive disorder        No past surgical history on file.    Review of patient's allergies indicates:   Allergen Reactions    Penicillins Swelling    Dapsone      G6PD deficiency  G6PD deficiency      Sulfamethoxazole-trimethoprim      G6PD deficiency  G6PD deficiency         Medications:  Medications Prior to Admission   Medication Sig    dulaglutide (TRULICITY) 1.5 mg/0.5 mL pen injector Inject 1.5 mg into the skin.    elviteg-cob-emtri-tenof ALAFEN (GENVOYA) 352-192-375-10 mg Tab Take 1 tablet by mouth.    insulin (LANTUS SOLOSTAR U-100 INSULIN) glargine 100 units/mL SubQ pen Inject 30 Units into the skin.    insulin lispro (HUMALOG KWIKPEN INSULIN) 100 unit/mL pen Inject 22 Units into the skin.    buPROPion (WELLBUTRIN XL) 150 MG TB24 tablet Take 150 mg by mouth every morning.    famotidine (PEPCID) 40 MG tablet Take 40 mg by mouth.    gabapentin (NEURONTIN) 800 MG tablet Take 800 mg by mouth 3 (three) times daily.    hydrOXYzine pamoate (VISTARIL) 25 MG Cap Take 25 mg by mouth 3 (three) times daily.    pravastatin (PRAVACHOL) 40 MG tablet Take 40 mg by mouth.     Antibiotics (From admission, onward)      Start     Stop Route Frequency Ordered    05/17/23 0148  vancomycin - pharmacy to dose  (vancomycin IVPB)        See Hyperspace for full Linked Orders Report.    -- IV pharmacy to manage frequency 05/17/23 0050          Antifungals (From admission, onward)      None          Antivirals (From admission, onward)          Stop Route Frequency     elviteg-cob-emtri-tenof ALAFEN         -- Oral Daily             Immunization History   Administered Date(s) Administered    COVID-19, MRNA, LN-S, PF (Pfizer) (Purple Cap) 03/09/2021, 03/31/2021       Family History       Family history is unknown by patient.          Social History     Socioeconomic History    Marital status: Single     Review of Systems   Constitutional:  Negative for fever.    Musculoskeletal:  Positive for arthralgias.   All other systems reviewed and are negative.  Objective:     Vital Signs (Most Recent):  Temp: 97.8 °F (36.6 °C) (05/17/23 0807)  Pulse: 86 (05/17/23 0957)  Resp: 18 (05/17/23 0807)  BP: (!) 156/76 (05/17/23 0807)  SpO2: 98 % (05/17/23 0807) Vital Signs (24h Range):  Temp:  [97.6 °F (36.4 °C)-100.5 °F (38.1 °C)] 97.8 °F (36.6 °C)  Pulse:  [75-96] 86  Resp:  [17-64] 18  SpO2:  [94 %-98 %] 98 %  BP: (123-156)/(62-77) 156/76     Weight: 70.7 kg (155 lb 13.8 oz)  Body mass index is 25.16 kg/m².    Estimated Creatinine Clearance: 42 mL/min (based on SCr of 1.3 mg/dL).     Physical Exam  Vitals and nursing note reviewed.   Constitutional:       General: She is not in acute distress.     Appearance: Normal appearance. She is not ill-appearing, toxic-appearing or diaphoretic.   HENT:      Head: Normocephalic and atraumatic.   Eyes:      Pupils: Pupils are equal, round, and reactive to light.   Cardiovascular:      Rate and Rhythm: Normal rate.   Neurological:      General: No focal deficit present.      Mental Status: She is alert and oriented to person, place, and time. Mental status is at baseline.   Psychiatric:         Mood and Affect: Mood normal.         Behavior: Behavior normal.         Thought Content: Thought content normal.         Judgment: Judgment normal.        Significant Labs: Blood Culture: No results for input(s): LABBLOO in the last 4320 hours.  C4 Count: No results for input(s): C4 in the last 48 hours.  CBC:   Recent Labs   Lab 05/16/23 1958 05/17/23  0440   WBC 8.72 5.41   HGB 13.0 12.4   HCT 38.4 37.3    187     CMP:   Recent Labs   Lab 05/16/23 1958 05/17/23  0440    138   K 4.0 4.4    108   CO2 24 21*   * 266*   BUN 30* 23   CREATININE 2.0* 1.3   CALCIUM 9.7 8.7   PROT 8.5* 7.6   ALBUMIN 4.1 3.7   BILITOT 0.4 0.3   ALKPHOS 100 85   AST 49* 36   ALT 41 36   ANIONGAP 11 9     CSF: No results for input(s): CSFCULTURE in the  last 4320 hours.    Significant Imaging: I have reviewed all pertinent imaging results/findings within the past 24 hours.

## 2023-05-17 NOTE — ASSESSMENT & PLAN NOTE
Patient's FSGs are uncontrolled due to hyperglycemia on current medication regimen.  Last A1c reviewed-   Lab Results   Component Value Date    HGBA1C 11.2 (H) 10/11/2021     Most recent fingerstick glucose reviewed-   Recent Labs   Lab 05/16/23  1925   POCTGLUCOSE 144*     Current correctional scale  Medium  Maintain anti-hyperglycemic dose as follows-   Antihyperglycemics (From admission, onward)    Start     Stop Route Frequency Ordered    05/17/23 0900  insulin detemir U-100 (Levemir) pen 15 Units         -- SubQ Daily 05/16/23 2203 05/16/23 2302  insulin aspart U-100 pen 1-10 Units         -- SubQ Before meals & nightly PRN 05/16/23 2203        Hold Oral hypoglycemics while patient is in the hospital.

## 2023-05-17 NOTE — CONSULTS
[facility]    Consultation started: 5/17/2023 at 9:37 AM   The chief complaint leading to consultation is:  Headache and neck pain      Inpatient consult to Neurology  Consult performed by: Maxim Delgadillo MD  Consult ordered by: Ashvin Paula NP         Subjective:     History of Present Illness:  Jori Richmond is a 62 y.o. female with a history of diabetes, migraines, HIV, HTN, s/p C6 corpectomy and ACDF C4-C7 who presents with headaches, neck pain and photophobia.    She reports that she fell off the toilet seat yesterday and sustained head trauma.  No loss of consciousness.  Since then she has been experiencing a dull occipital headache that radiates to her neck and exacerbated by neck movements.  She also complains of nausea, photophobia and phonophobia.  She does endorse a history of migraines which she describes as holocephalic headaches with associated migrainous features.  Complains of progressive gait instability for many years.  Over the last few months she has been falling more frequently, nearly every week.  She has been diagnosed with neuropathy in the past and is on gabapentin.    Underwent lumbar puncture in the ER.  CSF showed elevated protein at 90 although no white cells.  CSF Gram stain and culture were unremarkable.  She was started empirically on Rocephin.    Patient information was obtained from patient and past medical records.    Past Medical History:   Diagnosis Date    Diabetes mellitus     Digestive disorder        No past surgical history on file.    Family History   Family history unknown: Yes        Social History     Tobacco Use    Smoking status: Not on file    Smokeless tobacco: Not on file   Substance Use Topics    Alcohol use: Not on file        Medications:   Current Facility-Administered Medications:     0.9%  NaCl infusion, , Intravenous, Continuous, Ashvin Paula NP, Last Rate: 125 mL/hr at 05/17/23 0620, Rate Verify at  05/17/23 0620    acetaminophen tablet 650 mg, 650 mg, Oral, Q4H PRN, Ashvin Paula NP, 650 mg at 05/16/23 2340    cefTRIAXone (ROCEPHIN) 1 g in dextrose 5 % in water (D5W) 5 % 50 mL IVPB (MB+), 1 g, Intravenous, Q24H, Ashvin Paula NP    dextrose 10% bolus 125 mL 125 mL, 12.5 g, Intravenous, PRN, Ashvin Paula NP    dextrose 10% bolus 250 mL 250 mL, 25 g, Intravenous, PRN, Ashvin Paula NP    dextrose 40 % gel 15,000 mg, 15 g, Oral, PRN, Ashvin Paula NP    dextrose 40 % gel 30,000 mg, 30 g, Oral, PRN, Ashvin Paula NP    elviteg-cob-emtri-tenof ALAFEN 871-901-604-10 mg Tab 1 tablet, 1 tablet, Oral, Daily, Ashvin Paula NP, 1 tablet at 05/17/23 0858    glucagon (human recombinant) injection 1 mg, 1 mg, Intramuscular, PRN, Ashvin Paula NP    heparin (porcine) injection 5,000 Units, 5,000 Units, Subcutaneous, Q8H, Ashvin Paula NP, 5,000 Units at 05/17/23 0534    insulin aspart U-100 pen 1-10 Units, 1-10 Units, Subcutaneous, QID (AC + HS) PRN, Ashvin Paula NP    insulin detemir U-100 (Levemir) pen 15 Units, 15 Units, Subcutaneous, Daily, Ashvin Paula NP    naloxone 0.4 mg/mL injection 0.02 mg, 0.02 mg, Intravenous, PRN, Ashvin Paula NP    ondansetron injection 4 mg, 4 mg, Intravenous, Q8H PRN, Ashvin Paula NP    sodium chloride 0.9% flush 10 mL, 10 mL, Intravenous, Q8H PRN, Ashvin Paula NP    Pharmacy to dose Vancomycin consult, , , Once **AND** vancomycin - pharmacy to dose, , Intravenous, pharmacy to manage frequency, Ashvin Paula NP    Allergies:   Review of patient's allergies indicates:   Allergen Reactions    Penicillins Swelling    Dapsone      G6PD deficiency  G6PD deficiency      Sulfamethoxazole-trimethoprim      G6PD deficiency  G6PD deficiency         Review Of Systems: ROS      Objective:     Vitals: Blood pressure (!) 156/76, pulse 75, temperature 97.8 °F (36.6 °C), temperature source Oral, resp. rate 18,  "height 5' 6" (1.676 m), weight 70.7 kg (155 lb 13.8 oz), SpO2 98 %, not currently breastfeeding. Reviewed for date of service     Constitutional: Well-developed, well-nourished, appears stated age    Physical Exam  Musculoskeletal:      Right shoulder: Tenderness present. Decreased range of motion.      Left shoulder: Tenderness present. Decreased range of motion.         Neurological:    Mental status: Alert and oriented to person, place, time, and situation; no dysarthria; no aphasia; follows commands  Kernig's and Brudzinski's negative.  Cranial nerves:   CN II: Pupils 3--2 OU   CN III, IV, VI: Extraocular movements full, no nystagmus visualized  CN V: Symmetric sensation to touch   CN VII: Face strong and symmetric bilaterally   CN VIII: Hearing grossly intact   CN IX, X: Palate raises midline and symmetric   CN XI: Strong shoulder shrug B   CN XII: Tongue appears midline   Motor:  4+/5 with  strength in the left hand.  5/5 in all other muscle groups and extremities.  Sensory: I decreased sensation to light touch and pinprick in the left upper and lower extremity.  Decreased vibration sense in the left lower extremity.  Temperature intact  Coordination: No dysmetria or tremor with outstretched hands.  Finger-to-nose and heel-shin intact.  Gait:  Not OOB  Deep tendon reflexes: DTRs 1+ and symmetric throughout, toes downgoing on Babinski testing.  Negative Adrián's      Labs:Reviewed for date of service  Radiology (i.e. PET Scan, X-ray, CT, MRI): Reviewed for date of service    Assessment - Diagnosis - Goals:     Impression: Jori Richmond 62 y.o. female with a history of diabetes, migraines, HIV, HTN, s/p C6 corpectomy and ACDF C4-C7 who presents with headache and neck pain after a fall with head trauma.  She does also complain of nausea, photophobia and phonophobia although her CSF analysis shows a normal white cell count and is not suggestive of meningoncephalitis. ID has been consulted. She is now off " antibiotics.  Her protein is elevated at 90 - this is possibly a chronic elevation due to her history of cervical spine surgery and hardware placement (although her protein is unusually high). Her C spine surgery could also explain the left sided sensory deficits that she has on exam. She does not have any other signs or symptoms to suggest   Her longstanding history of gait instability could be due to peripheral neuropathy    Recommend MRI of the brain W W/O contrast to evaluate for any evidence of underlying inflammation to explain her elevated protein  For headache, Ibuprofen/Tylenol as needed + Mg oxide 400mg daily.      The attending portion of this evaluation, treatment, and documentation was performed per Maxim Delgadillo MD      Thank you for this consult. Please do not hesitate to contact us with questions.

## 2023-05-17 NOTE — HPI
61 yo woman with h/o migraine headaches, admitted with worsening headache. The patient tells me that she was prescribed antimigraine medication but it did not help. She presented to the ED and underwent CSF analysis demonstrating elevated protein (9-0) but no WBCs.She was admitted to the hospital and started on abx. The patient also has HIV infection and is on cART. She does not remember her HIV provider but is adherent with ART.     HIV Care: [facility]  Last seen by Chalino 6/2019, [facility], well controlled, 8/2022 s/p septoplastic & BL interfior tubrinate reduction 2/2 chronic sinusitis that may have been the underlying cause of her subjective headaches, migraine sxs improved.    12/21/2020 CT C-spine Partial corpectomy of C6 with interbody cage. ACDF plate and screw fixation hardware extending from C4 through C7. Interbody spacer at C4-5. Hardware is intact and stable compared to prior. Multilevel cervical facet arthropathy. Degenerative change at C1-2 articulations bilaterally. (As per imaging study); NSGY appt 9/15/23, following-up with them, CT scan of neck as per them      Healthcare maintenance  Mammo - 2/24/23 ordered, managed by PCP  C-scope - 2018, managed by PCP  Hep A - immune  Hep B - immune  Hep C - will check HCV PCR  T-spot - 9/14/22 negative  GC/Chlamydia - 9/14/22 negative  Syphilis - 9/14/22 negative  Sees GYN for urinary incontenence  DEXA - due  Vaccines - declined

## 2023-05-17 NOTE — PROGRESS NOTES
[facility] ([address] Medicine  Progress Note    Patient Name: Jori Richmond  MRN: [id number]  Patient Class: OP- Observation   Admission Date: 5/16/2023  Length of Stay: 0 days  Attending Physician: Venus Fleming MD  Primary Care Provider: Primary Doctor No        Subjective:     Principal Problem:Acute encephalopathy        HPI:  The patient is a 62 y.o. female with a past medical history of DM and HIV who presents with multiple complaints, including headaches with neck pain and photophobia. Patient recounts a two week history of difficulty with her equilibrium, leading to falls when walking or turning. She also has difficulty concentrating and loses track of sentences as she is trying to express ideas. Patient reports sometimes seeing things that are not there. She denies any chest pain, cough, shortness of breath, diarrhea, or vomiting.  Her daughter-in-law provides additional history. Patient is compliant with medication.  Patient also had a tooth extraction one week ago (May 9).  On initial workup, the patient is noted to have elevated protein and glucose in the CSF.  The patient also has a mild SATISH with a creatinine of 2.  She will be admitted for further management of her acute encephalopathy and acute kidney injury with Neurology consult.      Overview/Hospital Course:  No notes on file    Interval History: Still having headache and neck pain, is okay with the light on as long as she doesn't look directly at it    Review of Systems   Constitutional:  Negative for activity change, appetite change and fever.   HENT:  Negative for congestion, ear pain, rhinorrhea and sinus pressure.    Eyes:  Negative for pain and discharge.   Respiratory:  Negative for cough, chest tightness, shortness of breath and wheezing.    Cardiovascular:  Negative for chest pain and leg swelling.   Gastrointestinal:  Negative for abdominal distention, abdominal pain, diarrhea, nausea and vomiting.    Endocrine: Negative for cold intolerance and heat intolerance.   Genitourinary:  Negative for difficulty urinating, flank pain, frequency, hematuria and urgency.   Musculoskeletal:  Negative for arthralgias, joint swelling and myalgias.   Allergic/Immunologic: Negative for environmental allergies and food allergies.   Neurological:  Positive for dizziness, weakness and headaches. Negative for light-headedness.   Hematological:  Does not bruise/bleed easily.   Psychiatric/Behavioral:  Negative for agitation, behavioral problems and decreased concentration.    Objective:     Vital Signs (Most Recent):  Temp: 98.6 °F (37 °C) (05/17/23 1556)  Pulse: 81 (05/17/23 1558)  Resp: 18 (05/17/23 1118)  BP: (!) 148/75 (05/17/23 1556)  SpO2: 98 % (05/17/23 1556) Vital Signs (24h Range):  Temp:  [97.6 °F (36.4 °C)-100.5 °F (38.1 °C)] 98.6 °F (37 °C)  Pulse:  [75-96] 81  Resp:  [17-64] 18  SpO2:  [94 %-99 %] 98 %  BP: (122-156)/(62-77) 148/75     Weight: 70.7 kg (155 lb 13.8 oz)  Body mass index is 25.16 kg/m².    Intake/Output Summary (Last 24 hours) at 5/17/2023 1637  Last data filed at 5/17/2023 1103  Gross per 24 hour   Intake 1608.85 ml   Output 1200 ml   Net 408.85 ml         Physical Exam  Constitutional:       Appearance: Normal appearance. She is well-developed.   HENT:      Head: Normocephalic.   Eyes:      General:         Right eye: No discharge.         Left eye: No discharge.      Conjunctiva/sclera: Conjunctivae normal.   Cardiovascular:      Rate and Rhythm: Normal rate and regular rhythm.      Pulses:           Radial pulses are 2+ on the right side and 2+ on the left side.      Heart sounds: Normal heart sounds.   Pulmonary:      Effort: Pulmonary effort is normal. No respiratory distress.      Breath sounds: Normal breath sounds.   Abdominal:      General: Bowel sounds are normal. There is no distension.      Palpations: Abdomen is soft.      Tenderness: There is no abdominal tenderness.   Musculoskeletal:          General: Normal range of motion.      Cervical back: Normal range of motion and neck supple.   Skin:     General: Skin is warm and dry.   Neurological:      Mental Status: She is alert and oriented to person, place, and time.      GCS: GCS eye subscore is 4. GCS verbal subscore is 5. GCS motor subscore is 6.      Motor: Motor function is intact.   Psychiatric:         Mood and Affect: Mood normal.         Speech: Speech normal.         Behavior: Behavior normal.         Thought Content: Thought content normal.           Significant Labs: All pertinent labs within the past 24 hours have been reviewed.  BMP:   Recent Labs   Lab 05/17/23  0440   *      K 4.4      CO2 21*   BUN 23   CREATININE 1.3   CALCIUM 8.7   MG 1.9     CBC:   Recent Labs   Lab 05/16/23 1958 05/17/23 0440   WBC 8.72 5.41   HGB 13.0 12.4   HCT 38.4 37.3    187       Significant Imaging: I have reviewed all pertinent imaging results/findings within the past 24 hours.      Assessment/Plan:      * Acute encephalopathy  CT- No acute intracranial abnormalities identified    Hold medications that can alter mental status  CSF- elevated protein and glucose  Consult Neurology  IVF  Rocephin/Vanc with fevers  Maintain isolation      HIV disease  This patient in known to have HIV+ status (Have detected HIV PCR but never CD4 <200 or AIDS defining illness). Labs reviewed- CD4 greater than 700 in March.  Patient is on HAART. Will continue HAART. Prophylaxis was not indicated at this time- . Continue to monitor routine labs.     We will not consult Infectious disease at this time. Other HIV-associated diseases are not present.        SATISH (acute kidney injury)  Patient with acute kidney injury likely due to IVVD/dehydration SATISH is currently stable. Labs reviewed- Renal function/electrolytes with Estimated Creatinine Clearance: 42 mL/min (based on SCr of 1.3 mg/dL). according to latest data. Monitor urine output and serial BMP and  adjust therapy as needed. Avoid nephrotoxins and renally dose meds for GFR listed above.       Type 2 diabetes mellitus with hyperglycemia, with long-term current use of insulin  Patient's FSGs are uncontrolled due to hyperglycemia on current medication regimen.  Last A1c reviewed-   Lab Results   Component Value Date    HGBA1C 10.5 (H) 05/17/2023     Most recent fingerstick glucose reviewed-   Recent Labs   Lab 05/16/23  1925 05/17/23  0743 05/17/23  1134   POCTGLUCOSE 144* 233* 279*     Current correctional scale  Medium  Maintain anti-hyperglycemic dose as follows-   Antihyperglycemics (From admission, onward)    Start     Stop Route Frequency Ordered    05/17/23 0900  insulin detemir U-100 (Levemir) pen 15 Units         -- SubQ Daily 05/16/23 2203 05/16/23 2302  insulin aspart U-100 pen 1-10 Units         -- SubQ Before meals & nightly PRN 05/16/23 2203        Hold Oral hypoglycemics while patient is in the hospital.      VTE Risk Mitigation (From admission, onward)         Ordered     heparin (porcine) injection 5,000 Units  Every 8 hours         05/16/23 2203     IP VTE LOW RISK PATIENT  Once         05/16/23 2203     Place sequential compression device  Until discontinued         05/16/23 2203                Discharge Planning   DENISHA:      Code Status: Full Code   Is the patient medically ready for discharge?:     Reason for patient still in hospital (select all that apply): Imaging, Consult recommendations and Pending disposition                     Venus Fleming MD  Department of Hospital Medicine   [facility] ([address])

## 2023-05-17 NOTE — ASSESSMENT & PLAN NOTE
This patient in known to have HIV+ status (Have detected HIV PCR but never CD4 <200 or AIDS defining illness). Labs reviewed- CD4 greater than 700 in March.  Patient is on HAART. Will continue HAART. Prophylaxis was not indicated at this time- . Continue to monitor routine labs.     We will not consult Infectious disease at this time. Other HIV-associated diseases are not present.

## 2023-05-18 PROBLEM — R51.9 HEADACHE: Status: ACTIVE | Noted: 2023-05-18

## 2023-05-18 LAB
ALBUMIN SERPL BCP-MCNC: 3.7 G/DL (ref 3.5–5.2)
ALP SERPL-CCNC: 93 U/L (ref 55–135)
ALT SERPL W/O P-5'-P-CCNC: 34 U/L (ref 10–44)
ANION GAP SERPL CALC-SCNC: 9 MMOL/L (ref 8–16)
AST SERPL-CCNC: 32 U/L (ref 10–40)
BACTERIA UR CULT: ABNORMAL
BASOPHILS # BLD AUTO: 0.03 K/UL (ref 0–0.2)
BASOPHILS NFR BLD: 0.5 % (ref 0–1.9)
BILIRUB SERPL-MCNC: 0.3 MG/DL (ref 0.1–1)
BUN SERPL-MCNC: 17 MG/DL (ref 8–23)
CALCIUM SERPL-MCNC: 9.1 MG/DL (ref 8.7–10.5)
CHLORIDE SERPL-SCNC: 106 MMOL/L (ref 95–110)
CO2 SERPL-SCNC: 20 MMOL/L (ref 23–29)
CREAT SERPL-MCNC: 1.2 MG/DL (ref 0.5–1.4)
DIFFERENTIAL METHOD: ABNORMAL
EOSINOPHIL # BLD AUTO: 0.1 K/UL (ref 0–0.5)
EOSINOPHIL NFR BLD: 1.6 % (ref 0–8)
ERYTHROCYTE [DISTWIDTH] IN BLOOD BY AUTOMATED COUNT: 11.2 % (ref 11.5–14.5)
EST. GFR  (NO RACE VARIABLE): 51 ML/MIN/1.73 M^2
GLUCOSE SERPL-MCNC: 262 MG/DL (ref 70–110)
HCT VFR BLD AUTO: 37.3 % (ref 37–48.5)
HGB BLD-MCNC: 12.3 G/DL (ref 12–16)
IMM GRANULOCYTES # BLD AUTO: 0.01 K/UL (ref 0–0.04)
IMM GRANULOCYTES NFR BLD AUTO: 0.2 % (ref 0–0.5)
LYMPHOCYTES # BLD AUTO: 2.7 K/UL (ref 1–4.8)
LYMPHOCYTES NFR BLD: 47.3 % (ref 18–48)
MAGNESIUM SERPL-MCNC: 2 MG/DL (ref 1.6–2.6)
MCH RBC QN AUTO: 33.1 PG (ref 27–31)
MCHC RBC AUTO-ENTMCNC: 33 G/DL (ref 32–36)
MCV RBC AUTO: 100 FL (ref 82–98)
MONOCYTES # BLD AUTO: 0.4 K/UL (ref 0.3–1)
MONOCYTES NFR BLD: 7.6 % (ref 4–15)
NEUTROPHILS # BLD AUTO: 2.4 K/UL (ref 1.8–7.7)
NEUTROPHILS NFR BLD: 42.8 % (ref 38–73)
NRBC BLD-RTO: 0 /100 WBC
PHOSPHATE SERPL-MCNC: 3.3 MG/DL (ref 2.7–4.5)
PLATELET # BLD AUTO: 194 K/UL (ref 150–450)
PMV BLD AUTO: 9.5 FL (ref 9.2–12.9)
POCT GLUCOSE: 243 MG/DL (ref 70–110)
POCT GLUCOSE: 256 MG/DL (ref 70–110)
POCT GLUCOSE: 281 MG/DL (ref 70–110)
POCT GLUCOSE: 282 MG/DL (ref 70–110)
POTASSIUM SERPL-SCNC: 4.6 MMOL/L (ref 3.5–5.1)
PROT SERPL-MCNC: 7.9 G/DL (ref 6–8.4)
RBC # BLD AUTO: 3.72 M/UL (ref 4–5.4)
SODIUM SERPL-SCNC: 135 MMOL/L (ref 136–145)
WBC # BLD AUTO: 5.64 K/UL (ref 3.9–12.7)

## 2023-05-18 PROCEDURE — 80053 COMPREHEN METABOLIC PANEL: CPT | Performed by: NURSE PRACTITIONER

## 2023-05-18 PROCEDURE — 96375 TX/PRO/DX INJ NEW DRUG ADDON: CPT

## 2023-05-18 PROCEDURE — 84100 ASSAY OF PHOSPHORUS: CPT | Performed by: NURSE PRACTITIONER

## 2023-05-18 PROCEDURE — G0378 HOSPITAL OBSERVATION PER HR: HCPCS

## 2023-05-18 PROCEDURE — 25000003 PHARM REV CODE 250: Performed by: NURSE PRACTITIONER

## 2023-05-18 PROCEDURE — 85025 COMPLETE CBC W/AUTO DIFF WBC: CPT | Performed by: NURSE PRACTITIONER

## 2023-05-18 PROCEDURE — 83735 ASSAY OF MAGNESIUM: CPT | Performed by: NURSE PRACTITIONER

## 2023-05-18 PROCEDURE — 96372 THER/PROPH/DIAG INJ SC/IM: CPT | Performed by: NURSE PRACTITIONER

## 2023-05-18 PROCEDURE — 63600175 PHARM REV CODE 636 W HCPCS: Performed by: NURSE PRACTITIONER

## 2023-05-18 PROCEDURE — 63600175 PHARM REV CODE 636 W HCPCS: Performed by: INTERNAL MEDICINE

## 2023-05-18 PROCEDURE — 36415 COLL VENOUS BLD VENIPUNCTURE: CPT | Performed by: NURSE PRACTITIONER

## 2023-05-18 PROCEDURE — 25000003 PHARM REV CODE 250: Performed by: INTERNAL MEDICINE

## 2023-05-18 RX ORDER — DOCUSATE SODIUM 100 MG/1
100 CAPSULE, LIQUID FILLED ORAL DAILY
Status: DISCONTINUED | OUTPATIENT
Start: 2023-05-18 | End: 2023-05-19 | Stop reason: HOSPADM

## 2023-05-18 RX ORDER — SYRING-NEEDL,DISP,INSUL,0.3 ML 29 G X1/2"
296 SYRINGE, EMPTY DISPOSABLE MISCELLANEOUS ONCE
Status: COMPLETED | OUTPATIENT
Start: 2023-05-18 | End: 2023-05-18

## 2023-05-18 RX ORDER — PROCHLORPERAZINE EDISYLATE 5 MG/ML
5 INJECTION INTRAMUSCULAR; INTRAVENOUS ONCE
Status: COMPLETED | OUTPATIENT
Start: 2023-05-18 | End: 2023-05-18

## 2023-05-18 RX ORDER — BUTALBITAL, ACETAMINOPHEN AND CAFFEINE 50; 325; 40 MG/1; MG/1; MG/1
1 TABLET ORAL EVERY 6 HOURS PRN
Status: DISCONTINUED | OUTPATIENT
Start: 2023-05-18 | End: 2023-05-19 | Stop reason: HOSPADM

## 2023-05-18 RX ORDER — KETOROLAC TROMETHAMINE 30 MG/ML
15 INJECTION, SOLUTION INTRAMUSCULAR; INTRAVENOUS ONCE
Status: COMPLETED | OUTPATIENT
Start: 2023-05-18 | End: 2023-05-18

## 2023-05-18 RX ADMIN — INSULIN ASPART 4 UNITS: 100 INJECTION, SOLUTION INTRAVENOUS; SUBCUTANEOUS at 04:05

## 2023-05-18 RX ADMIN — INSULIN DETEMIR 15 UNITS: 100 INJECTION, SOLUTION SUBCUTANEOUS at 09:05

## 2023-05-18 RX ADMIN — HYDROCODONE BITARTRATE AND ACETAMINOPHEN 1 TABLET: 5; 325 TABLET ORAL at 05:05

## 2023-05-18 RX ADMIN — INSULIN ASPART 6 UNITS: 100 INJECTION, SOLUTION INTRAVENOUS; SUBCUTANEOUS at 09:05

## 2023-05-18 RX ADMIN — HEPARIN SODIUM 5000 UNITS: 5000 INJECTION INTRAVENOUS; SUBCUTANEOUS at 05:05

## 2023-05-18 RX ADMIN — ACETAMINOPHEN 650 MG: 325 TABLET, FILM COATED ORAL at 11:05

## 2023-05-18 RX ADMIN — INSULIN ASPART 3 UNITS: 100 INJECTION, SOLUTION INTRAVENOUS; SUBCUTANEOUS at 09:05

## 2023-05-18 RX ADMIN — HEPARIN SODIUM 5000 UNITS: 5000 INJECTION INTRAVENOUS; SUBCUTANEOUS at 01:05

## 2023-05-18 RX ADMIN — KETOROLAC TROMETHAMINE 15 MG: 30 INJECTION, SOLUTION INTRAMUSCULAR; INTRAVENOUS at 12:05

## 2023-05-18 RX ADMIN — DOCUSATE SODIUM 100 MG: 100 CAPSULE, LIQUID FILLED ORAL at 09:05

## 2023-05-18 RX ADMIN — ELVITEGRAVIR, COBICISTAT, EMTRICITABINE, AND TENOFOVIR ALAFENAMIDE 1 TABLET: 150; 150; 200; 10 TABLET ORAL at 09:05

## 2023-05-18 RX ADMIN — BE HEALTH MAGNESIUM CITRATE ORAL SOLUTION - LEMON 296 ML: 1.75 LIQUID ORAL at 12:05

## 2023-05-18 RX ADMIN — PROCHLORPERAZINE EDISYLATE 5 MG: 5 INJECTION INTRAMUSCULAR; INTRAVENOUS at 12:05

## 2023-05-18 RX ADMIN — Medication 400 MG: at 09:05

## 2023-05-18 NOTE — PROGRESS NOTES
Pharmacokinetic Assessment Follow Up: IV Vancomycin    Vancomycin serum concentration assessment(s):    The random level was drawn correctly and can be used to guide therapy at this time. The measurement is below the desired definitive target range of 10 to 20 mcg/mL.    Vancomycin Regimen Plan:    Change regimen to Vancomycin 1250 mg IV every 24 hours with next serum trough concentration measured at 2300 prior to the dose on 5/19/23    Drug levels (last 3 results):  Recent Labs   Lab Result Units 05/17/23  2147   Vancomycin, Random ug/mL 8.0       Pharmacy will continue to follow and monitor vancomycin.    Please contact pharmacy at extension [phone] for questions regarding this assessment.    Thank you for the consult,   Donna Raygoza       Patient brief summary:  Jori Richmond is a 62 y.o. female initiated on antimicrobial therapy with IV Vancomycin for treatment of  encephalitis    Drug Allergies:   Review of patient's allergies indicates:   Allergen Reactions    Penicillins Swelling    Dapsone      G6PD deficiency  G6PD deficiency      Sulfamethoxazole-trimethoprim      G6PD deficiency  G6PD deficiency         Actual Body Weight:   70.7 kg    Renal Function:   Estimated Creatinine Clearance: 42 mL/min (based on SCr of 1.3 mg/dL).,     Dialysis Method (if applicable):  N/A    CBC (last 72 hours):  Recent Labs   Lab Result Units 05/16/23 1958 05/17/23  0440   WBC K/uL 8.72 5.41   Hemoglobin g/dL 13.0 12.4   Hemoglobin A1C %  --  10.5*   Hematocrit % 38.4 37.3   Platelets K/uL 224 187   Gran % % 52.9 54.0   Lymph % % 36.6 35.1   Mono % % 8.3 8.5   Eosinophil % % 1.1 1.3   Basophil % % 0.8 0.7   Differential Method  Automated Automated       Metabolic Panel (last 72 hours):  Recent Labs   Lab Result Units 05/16/23 1958 05/16/23 2120 05/16/23 2146 05/17/23  0440   Sodium mmol/L 139  --   --  138   Potassium mmol/L 4.0  --   --  4.4   Chloride mmol/L 104  --   --  108   CO2 mmol/L 24  --   --  21*   Glucose  mg/dL 173*  --   --  266*   Glucose, CSF mg/dL  --  135*  --   --    Glucose, UA   --   --  1+*  --    BUN mg/dL 30*  --   --  23   Creatinine mg/dL 2.0*  --   --  1.3   Creatinine, Urine mg/dL  --   --  79.3  --    Albumin g/dL 4.1  --   --  3.7   Total Bilirubin mg/dL 0.4  --   --  0.3   Alkaline Phosphatase U/L 100  --   --  85   AST U/L 49*  --   --  36   ALT U/L 41  --   --  36   Magnesium mg/dL  --   --   --  1.9   Phosphorus mg/dL  --   --   --  3.3       Vancomycin Administrations:  vancomycin given in the last 96 hours                     vancomycin 2 g in dextrose 5 % 500 mL IVPB ()  Restarted 05/16/23 2339     2,000 mg New Bag  2153                    Microbiologic Results:  Microbiology Results (last 7 days)       Procedure Component Value Units Date/Time    Blood culture x two cultures. Draw prior to antibiotics. [[id number]] Collected: 05/16/23 2006    Order Status: Completed Specimen: Blood from Peripheral, Forearm, Left Updated: 05/17/23 1915     Blood Culture, Routine No Growth to date    Narrative:      Aerobic and anaerobic    Blood culture x two cultures. Draw prior to antibiotics. [[id number]] Collected: 05/16/23 2000    Order Status: Completed Specimen: Blood from Peripheral, Antecubital, Right Updated: 05/17/23 1915     Blood Culture, Routine No Growth to date    Narrative:      Aerobic and anaerobic    CSF culture and Gram Stain (Tube 2) [[id number]] Collected: 05/16/23 2122    Order Status: Completed Specimen: CSF (Spinal Fluid) from CSF Tap, Tube 2 Updated: 05/17/23 0158     Gram Stain Result No WBC's      No organisms seen    Narrative:      On which sequentially labeled tube should this analysis be  performed?->2    Urine culture [[id number]] Collected: 05/16/23 2146    Order Status: No result Specimen: Urine Updated: 05/16/23 2207

## 2023-05-18 NOTE — SUBJECTIVE & OBJECTIVE
Interval History: Her L leg feels weak due to pain, still having a headache and is constipated.    Review of Systems   Constitutional:  Negative for activity change, appetite change and fever.   HENT:  Negative for congestion, ear pain, rhinorrhea and sinus pressure.    Eyes:  Negative for pain and discharge.   Respiratory:  Negative for cough, chest tightness, shortness of breath and wheezing.    Cardiovascular:  Negative for chest pain and leg swelling.   Gastrointestinal:  Negative for abdominal distention, abdominal pain, diarrhea, nausea and vomiting.   Endocrine: Negative for cold intolerance and heat intolerance.   Genitourinary:  Negative for difficulty urinating, flank pain, frequency, hematuria and urgency.   Musculoskeletal:  Negative for arthralgias, joint swelling and myalgias.   Allergic/Immunologic: Negative for environmental allergies and food allergies.   Neurological:  Positive for dizziness, weakness and headaches. Negative for light-headedness.   Hematological:  Does not bruise/bleed easily.   Psychiatric/Behavioral:  Negative for agitation, behavioral problems and decreased concentration.    Objective:     Vital Signs (Most Recent):  Temp: 98.2 °F (36.8 °C) (05/18/23 1542)  Pulse: 84 (05/18/23 1809)  Resp: 18 (05/18/23 1542)  BP: 130/64 (05/18/23 1542)  SpO2: 97 % (05/18/23 1542) Vital Signs (24h Range):  Temp:  [97.9 °F (36.6 °C)-98.6 °F (37 °C)] 98.2 °F (36.8 °C)  Pulse:  [77-95] 84  Resp:  [18-20] 18  SpO2:  [97 %-100 %] 97 %  BP: (130-166)/(64-80) 130/64     Weight: 70.7 kg (155 lb 13.8 oz)  Body mass index is 25.16 kg/m².    Intake/Output Summary (Last 24 hours) at 5/18/2023 1858  Last data filed at 5/18/2023 1630  Gross per 24 hour   Intake 606.17 ml   Output 1500 ml   Net -893.83 ml         Physical Exam  Constitutional:       Appearance: Normal appearance. She is well-developed.   HENT:      Head: Normocephalic.   Eyes:      General:         Right eye: No discharge.         Left eye: No  discharge.      Conjunctiva/sclera: Conjunctivae normal.   Cardiovascular:      Rate and Rhythm: Normal rate and regular rhythm.      Pulses:           Radial pulses are 2+ on the right side and 2+ on the left side.      Heart sounds: Normal heart sounds.   Pulmonary:      Effort: Pulmonary effort is normal. No respiratory distress.      Breath sounds: Normal breath sounds.   Abdominal:      General: Bowel sounds are normal. There is no distension.      Palpations: Abdomen is soft.      Tenderness: There is no abdominal tenderness.   Musculoskeletal:         General: Normal range of motion.      Cervical back: Normal range of motion and neck supple.   Skin:     General: Skin is warm and dry.   Neurological:      Mental Status: She is alert and oriented to person, place, and time.      GCS: GCS eye subscore is 4. GCS verbal subscore is 5. GCS motor subscore is 6.      Motor: Motor function is intact.   Psychiatric:         Mood and Affect: Mood normal.         Speech: Speech normal.         Behavior: Behavior normal.         Thought Content: Thought content normal.           Significant Labs: All pertinent labs within the past 24 hours have been reviewed.  BMP:   Recent Labs   Lab 05/18/23  0344   *   *   K 4.6      CO2 20*   BUN 17   CREATININE 1.2   CALCIUM 9.1   MG 2.0     CBC:   Recent Labs   Lab 05/16/23  1958 05/17/23  0440 05/18/23  0344   WBC 8.72 5.41 5.64   HGB 13.0 12.4 12.3   HCT 38.4 37.3 37.3    187 194       Significant Imaging: I have reviewed all pertinent imaging results/findings within the past 24 hours.

## 2023-05-19 VITALS
TEMPERATURE: 98 F | WEIGHT: 155.88 LBS | RESPIRATION RATE: 16 BRPM | SYSTOLIC BLOOD PRESSURE: 132 MMHG | HEART RATE: 90 BPM | DIASTOLIC BLOOD PRESSURE: 68 MMHG | BODY MASS INDEX: 25.05 KG/M2 | OXYGEN SATURATION: 95 % | HEIGHT: 66 IN

## 2023-05-19 LAB
ALBUMIN SERPL BCP-MCNC: 3.8 G/DL (ref 3.5–5.2)
ALP SERPL-CCNC: 86 U/L (ref 55–135)
ALT SERPL W/O P-5'-P-CCNC: 48 U/L (ref 10–44)
ANION GAP SERPL CALC-SCNC: 12 MMOL/L (ref 8–16)
AST SERPL-CCNC: 63 U/L (ref 10–40)
BASOPHILS # BLD AUTO: 0.06 K/UL (ref 0–0.2)
BASOPHILS NFR BLD: 1 % (ref 0–1.9)
BILIRUB SERPL-MCNC: 0.4 MG/DL (ref 0.1–1)
BUN SERPL-MCNC: 24 MG/DL (ref 8–23)
CALCIUM SERPL-MCNC: 9.2 MG/DL (ref 8.7–10.5)
CHLORIDE SERPL-SCNC: 105 MMOL/L (ref 95–110)
CO2 SERPL-SCNC: 18 MMOL/L (ref 23–29)
CREAT SERPL-MCNC: 1.3 MG/DL (ref 0.5–1.4)
DIFFERENTIAL METHOD: ABNORMAL
EOSINOPHIL # BLD AUTO: 0.1 K/UL (ref 0–0.5)
EOSINOPHIL NFR BLD: 1.2 % (ref 0–8)
ERYTHROCYTE [DISTWIDTH] IN BLOOD BY AUTOMATED COUNT: 11.2 % (ref 11.5–14.5)
EST. GFR  (NO RACE VARIABLE): 46 ML/MIN/1.73 M^2
GLUCOSE SERPL-MCNC: 260 MG/DL (ref 70–110)
HCT VFR BLD AUTO: 39.9 % (ref 37–48.5)
HGB BLD-MCNC: 13.2 G/DL (ref 12–16)
IMM GRANULOCYTES # BLD AUTO: 0.02 K/UL (ref 0–0.04)
IMM GRANULOCYTES NFR BLD AUTO: 0.3 % (ref 0–0.5)
LYMPHOCYTES # BLD AUTO: 2.8 K/UL (ref 1–4.8)
LYMPHOCYTES NFR BLD: 48.4 % (ref 18–48)
MAGNESIUM SERPL-MCNC: 2.2 MG/DL (ref 1.6–2.6)
MCH RBC QN AUTO: 33.2 PG (ref 27–31)
MCHC RBC AUTO-ENTMCNC: 33.1 G/DL (ref 32–36)
MCV RBC AUTO: 100 FL (ref 82–98)
MONOCYTES # BLD AUTO: 0.4 K/UL (ref 0.3–1)
MONOCYTES NFR BLD: 6.9 % (ref 4–15)
NEUTROPHILS # BLD AUTO: 2.4 K/UL (ref 1.8–7.7)
NEUTROPHILS NFR BLD: 42.2 % (ref 38–73)
NRBC BLD-RTO: 0 /100 WBC
PHOSPHATE SERPL-MCNC: 3.5 MG/DL (ref 2.7–4.5)
PLATELET # BLD AUTO: 202 K/UL (ref 150–450)
PMV BLD AUTO: 9.5 FL (ref 9.2–12.9)
POCT GLUCOSE: 255 MG/DL (ref 70–110)
POCT GLUCOSE: 356 MG/DL (ref 70–110)
POTASSIUM SERPL-SCNC: 4.7 MMOL/L (ref 3.5–5.1)
PROT SERPL-MCNC: 8.3 G/DL (ref 6–8.4)
RBC # BLD AUTO: 3.98 M/UL (ref 4–5.4)
SODIUM SERPL-SCNC: 135 MMOL/L (ref 136–145)
WBC # BLD AUTO: 5.79 K/UL (ref 3.9–12.7)

## 2023-05-19 PROCEDURE — 83735 ASSAY OF MAGNESIUM: CPT | Performed by: NURSE PRACTITIONER

## 2023-05-19 PROCEDURE — 80053 COMPREHEN METABOLIC PANEL: CPT | Performed by: NURSE PRACTITIONER

## 2023-05-19 PROCEDURE — 85025 COMPLETE CBC W/AUTO DIFF WBC: CPT | Performed by: NURSE PRACTITIONER

## 2023-05-19 PROCEDURE — 97162 PT EVAL MOD COMPLEX 30 MIN: CPT

## 2023-05-19 PROCEDURE — 84100 ASSAY OF PHOSPHORUS: CPT | Performed by: NURSE PRACTITIONER

## 2023-05-19 PROCEDURE — 96372 THER/PROPH/DIAG INJ SC/IM: CPT

## 2023-05-19 PROCEDURE — 25000003 PHARM REV CODE 250: Performed by: NURSE PRACTITIONER

## 2023-05-19 PROCEDURE — G0378 HOSPITAL OBSERVATION PER HR: HCPCS

## 2023-05-19 PROCEDURE — 36415 COLL VENOUS BLD VENIPUNCTURE: CPT | Performed by: NURSE PRACTITIONER

## 2023-05-19 PROCEDURE — 25000003 PHARM REV CODE 250: Performed by: INTERNAL MEDICINE

## 2023-05-19 RX ORDER — PROCHLORPERAZINE MALEATE 5 MG
5 TABLET ORAL 3 TIMES DAILY PRN
Qty: 21 TABLET | Refills: 0 | Status: SHIPPED | OUTPATIENT
Start: 2023-05-19 | End: 2023-05-26

## 2023-05-19 RX ORDER — LANOLIN ALCOHOL/MO/W.PET/CERES
400 CREAM (GRAM) TOPICAL DAILY
Qty: 30 TABLET | Refills: 0 | Status: SHIPPED | OUTPATIENT
Start: 2023-05-20 | End: 2023-06-19

## 2023-05-19 RX ORDER — BUTALBITAL, ACETAMINOPHEN AND CAFFEINE 50; 325; 40 MG/1; MG/1; MG/1
1 TABLET ORAL EVERY 6 HOURS PRN
Qty: 40 TABLET | Refills: 0 | Status: SHIPPED | OUTPATIENT
Start: 2023-05-19 | End: 2023-05-29

## 2023-05-19 RX ADMIN — INSULIN DETEMIR 15 UNITS: 100 INJECTION, SOLUTION SUBCUTANEOUS at 08:05

## 2023-05-19 RX ADMIN — Medication 400 MG: at 08:05

## 2023-05-19 RX ADMIN — INSULIN ASPART 10 UNITS: 100 INJECTION, SOLUTION INTRAVENOUS; SUBCUTANEOUS at 12:05

## 2023-05-19 RX ADMIN — INSULIN ASPART 6 UNITS: 100 INJECTION, SOLUTION INTRAVENOUS; SUBCUTANEOUS at 08:05

## 2023-05-19 RX ADMIN — DOCUSATE SODIUM 100 MG: 100 CAPSULE, LIQUID FILLED ORAL at 08:05

## 2023-05-19 RX ADMIN — ELVITEGRAVIR, COBICISTAT, EMTRICITABINE, AND TENOFOVIR ALAFENAMIDE 1 TABLET: 150; 150; 200; 10 TABLET ORAL at 08:05

## 2023-05-19 NOTE — PROGRESS NOTES
[facility] Surg ([address] Medicine  Progress Note    Patient Name: Jori Richmond  MRN: [id number]  Patient Class: OP- Observation   Admission Date: 5/16/2023  Length of Stay: 0 days  Attending Physician: Venus Fleming MD  Primary Care Provider: Tl Lebron Jr, MD        Subjective:     Principal Problem:Acute encephalopathy        HPI:  The patient is a 62 y.o. female with a past medical history of DM and HIV who presents with multiple complaints, including headaches with neck pain and photophobia. Patient recounts a two week history of difficulty with her equilibrium, leading to falls when walking or turning. She also has difficulty concentrating and loses track of sentences as she is trying to express ideas. Patient reports sometimes seeing things that are not there. She denies any chest pain, cough, shortness of breath, diarrhea, or vomiting.  Her daughter-in-law provides additional history. Patient is compliant with medication.  Patient also had a tooth extraction one week ago (May 9).  On initial workup, the patient is noted to have elevated protein and glucose in the CSF.  The patient also has a mild SATISH with a creatinine of 2.  She will be admitted for further management of her acute encephalopathy and acute kidney injury with Neurology consult.      Overview/Hospital Course:  No notes on file    Interval History: Her L leg feels weak due to pain, still having a headache and is constipated.    Review of Systems   Constitutional:  Negative for activity change, appetite change and fever.   HENT:  Negative for congestion, ear pain, rhinorrhea and sinus pressure.    Eyes:  Negative for pain and discharge.   Respiratory:  Negative for cough, chest tightness, shortness of breath and wheezing.    Cardiovascular:  Negative for chest pain and leg swelling.   Gastrointestinal:  Negative for abdominal distention, abdominal pain, diarrhea, nausea and vomiting.   Endocrine: Negative for cold  intolerance and heat intolerance.   Genitourinary:  Negative for difficulty urinating, flank pain, frequency, hematuria and urgency.   Musculoskeletal:  Negative for arthralgias, joint swelling and myalgias.   Allergic/Immunologic: Negative for environmental allergies and food allergies.   Neurological:  Positive for dizziness, weakness and headaches. Negative for light-headedness.   Hematological:  Does not bruise/bleed easily.   Psychiatric/Behavioral:  Negative for agitation, behavioral problems and decreased concentration.    Objective:     Vital Signs (Most Recent):  Temp: 98.2 °F (36.8 °C) (05/18/23 1542)  Pulse: 84 (05/18/23 1809)  Resp: 18 (05/18/23 1542)  BP: 130/64 (05/18/23 1542)  SpO2: 97 % (05/18/23 1542) Vital Signs (24h Range):  Temp:  [97.9 °F (36.6 °C)-98.6 °F (37 °C)] 98.2 °F (36.8 °C)  Pulse:  [77-95] 84  Resp:  [18-20] 18  SpO2:  [97 %-100 %] 97 %  BP: (130-166)/(64-80) 130/64     Weight: 70.7 kg (155 lb 13.8 oz)  Body mass index is 25.16 kg/m².    Intake/Output Summary (Last 24 hours) at 5/18/2023 1858  Last data filed at 5/18/2023 1630  Gross per 24 hour   Intake 606.17 ml   Output 1500 ml   Net -893.83 ml         Physical Exam  Constitutional:       Appearance: Normal appearance. She is well-developed.   HENT:      Head: Normocephalic.   Eyes:      General:         Right eye: No discharge.         Left eye: No discharge.      Conjunctiva/sclera: Conjunctivae normal.   Cardiovascular:      Rate and Rhythm: Normal rate and regular rhythm.      Pulses:           Radial pulses are 2+ on the right side and 2+ on the left side.      Heart sounds: Normal heart sounds.   Pulmonary:      Effort: Pulmonary effort is normal. No respiratory distress.      Breath sounds: Normal breath sounds.   Abdominal:      General: Bowel sounds are normal. There is no distension.      Palpations: Abdomen is soft.      Tenderness: There is no abdominal tenderness.   Musculoskeletal:         General: Normal range of  motion.      Cervical back: Normal range of motion and neck supple.   Skin:     General: Skin is warm and dry.   Neurological:      Mental Status: She is alert and oriented to person, place, and time.      GCS: GCS eye subscore is 4. GCS verbal subscore is 5. GCS motor subscore is 6.      Motor: Motor function is intact.   Psychiatric:         Mood and Affect: Mood normal.         Speech: Speech normal.         Behavior: Behavior normal.         Thought Content: Thought content normal.           Significant Labs: All pertinent labs within the past 24 hours have been reviewed.  BMP:   Recent Labs   Lab 05/18/23  0344   *   *   K 4.6      CO2 20*   BUN 17   CREATININE 1.2   CALCIUM 9.1   MG 2.0     CBC:   Recent Labs   Lab 05/16/23  1958 05/17/23  0440 05/18/23  0344   WBC 8.72 5.41 5.64   HGB 13.0 12.4 12.3   HCT 38.4 37.3 37.3    187 194       Significant Imaging: I have reviewed all pertinent imaging results/findings within the past 24 hours.      Assessment/Plan:      * Acute encephalopathy  CT- No acute intracranial abnormalities identified  Hold medications that can alter mental status  CSF- elevated protein and glucose  Consult Neurology  IVF  Seen by ID, stopped abx, do not feel this is infectious  Neurology consulted:  Recommend MRI of the brain W W/O contrast to evaluate for any evidence of underlying inflammation to explain her elevated protein  For headache, Ibuprofen/Tylenol as needed + Mg oxide 400mg daily.  Resolved.      Headache  Has a history of migraines  Has Imitrex on home med list  IV Toradol, IV compazine  PO Mag oxide      HIV disease  This patient in known to have HIV+ status (Have detected HIV PCR but never CD4 <200 or AIDS defining illness). Labs reviewed- CD4 greater than 700 in March.  Patient is on HAART. Will continue HAART. Prophylaxis was not indicated at this time- . Continue to monitor routine labs.     We will not consult Infectious disease at this time.  Other HIV-associated diseases are not present.        SATISH (acute kidney injury)  Patient with acute kidney injury likely due to IVVD/dehydration SATISH is currently stable. Labs reviewed- Renal function/electrolytes with Estimated Creatinine Clearance: 45.5 mL/min (based on SCr of 1.2 mg/dL). according to latest data. Monitor urine output and serial BMP and adjust therapy as needed. Avoid nephrotoxins and renally dose meds for GFR listed above.       Type 2 diabetes mellitus with hyperglycemia, with long-term current use of insulin  Patient's FSGs are uncontrolled due to hyperglycemia on current medication regimen.  Last A1c reviewed-   Lab Results   Component Value Date    HGBA1C 10.5 (H) 05/17/2023     Most recent fingerstick glucose reviewed-   Recent Labs   Lab 05/16/23  1925 05/17/23  0743 05/17/23  1134   POCTGLUCOSE 144* 233* 279*     Current correctional scale  Medium  Maintain anti-hyperglycemic dose as follows-   Antihyperglycemics (From admission, onward)    Start     Stop Route Frequency Ordered    05/17/23 0900  insulin detemir U-100 (Levemir) pen 15 Units         -- SubQ Daily 05/16/23 2203 05/16/23 2302  insulin aspart U-100 pen 1-10 Units         -- SubQ Before meals & nightly PRN 05/16/23 2203        Hold Oral hypoglycemics while patient is in the hospital.      VTE Risk Mitigation (From admission, onward)         Ordered     heparin (porcine) injection 5,000 Units  Every 8 hours         05/16/23 2203     IP VTE LOW RISK PATIENT  Once         05/16/23 2203     Place sequential compression device  Until discontinued         05/16/23 2203                Discharge Planning   DENISHA:      Code Status: Full Code   Is the patient medically ready for discharge?:     Reason for patient still in hospital (select all that apply): Patient trending condition, Treatment and Consult recommendations  Discharge Plan A: Home                  Venus Fleming MD  Department of Hospital Medicine   [facility] Surg  ([address])

## 2023-05-19 NOTE — PROGRESS NOTES
AVS reviewed with pt. Pt verbalizes understanding. PIV discontinued. Medications to be delivered to bedside. Pt called daughter in law.

## 2023-05-19 NOTE — PT/OT/SLP EVAL
Physical Therapy Evaluation and Discharge Note    Patient Name:  Jori Richmond   MRN:  [id number]    Recommendations:     Discharge Recommendations: outpatient PT  Discharge Equipment Recommendations: none   Barriers to discharge: None    Assessment:     Jori Richmond is a 62 y.o. female admitted with a medical diagnosis of Acute encephalopathy. Pmhx significant for DM (HbA1c 10.5) with history of LE neuropathy, HIV, ACDF C4-C7, and migraines. At this time, patient is functioning at their prior level of function and does not require further acute PT services.     Patient evaluated by PT and goals established. Patient with chronic balance issues with frequent falls without use of AD - multifocal issue with h/o diabetic neuropathy, migraines, and vertigo potentially contributing to instability. Pt demo's safe but slow mobility with RW, however without UE support pt at high risk for falls per Stevoetti. Discussion of f/u with OP PT to address balance. PT will continue to follow and progress as tolerated. Rec for OP PT.     Recent Surgery: * No surgery found *      Plan:     During this hospitalization, patient does not require further acute PT services.  Please re-consult if situation changes.      Subjective     Chief Complaint: Pain in head that she reports has worsened since recent traumatic event in past year - has improved during hospitalization  Patient/Family Comments/goals: Goal to return home, reports potential DC today; Patient agreeable to evaluation.  Pain/Comfort:  Pain Rating 1:  (unrated headache pain, improved with use of IV medications)  Pain Rating Post-Intervention 1:  (appears comfortable at rest, asking about DC)    Patients cultural, spiritual, Taoist conflicts given the current situation: no    Living Environment:  Pt's family lives with her in a WC accessible apartment with no steps to enter  Pt has a shower with grab bars and a toilet with grab bars.   Upon discharge, patient will have  assistance from her family as needed.  Prior level of function:  Ambulation: Mod(I) with RW for longer distances/extended time in standing (bathroom, kitchen), uses furniture cruising for narrow places in her apt  ADL's: Mod(I)  Falls: Reports frequent falls when not using RW for >1 year, reports variety mechanisms for falls including LE buckling, unable to fell feet and tripping, and leaning outside MARCUS and unable to maintain balance   Equipment used at home: walker, rolling, grab bar.      Objective:     Communicated with KARRI Patrick prior to session.  Patient found supine with peripheral IV upon PT entry to room.    General Precautions: Standard, fall, diabetic    Orthopedic Precautions:N/A   Braces: N/A  Respiratory Status: Room air    Patient donned non slip socks and gait belt for OOB mobility.    Exams:  Cognition:   Patient is oriented to person, , place, situation, general time.  Difficulty with recent timeline, reports hospitalization in March that occurred in January  Pt follows approximately 100% of one step commands.    Mood: Pleasant and cooperative.  Safety Awareness: Impaired  Musculoskeletal:  BMI: 25.16  Posture:  Forward head  LE ROM/Strength:   R ROM: WFL  L ROM: WFL  R Strength:   Knee extension: 5/5  Dorsiflexion: 5/5   L Strength:   Knee extension: 4/5  Dorsiflexion: 4/5   Neuromuscular:  Sensation: Reports tingling/numbness to general LLE, h/o neuropathy to B feet  Coordination/Tone/Reflexes: No impairments identified with functional mobility. No formal testing performed.   Balance:   Static sitting: Good  Static standing: Fair-  Dynamic standing: Fair-  Tinetti Total Score: 16  < 18 = High Risk of Falls (use of RW for amb, no RW for standing), 19-23 = Moderate Risk of Falls, > 24 = Low Risk of Falls  Visual-vestibular: No impairments identified with functional mobility. No formal testing performed.  Integument: Visible skin intact  Cardiopulmonary:  Edema: None noted      Functional  Mobility:  Bed Mobility:     Supine to Sit: independence  Sit to Supine: independence  Transfers:     Sit to Stand:  independence with rolling walker  Gait: x30 ft with RW and SBA progressing to mod(I).   Slow gait with narrowed step width, decreased step length, increased B stance time and maintains forward flexion of trunk with gaze at feet.   No overt knee buckling or LOB noted.   Maintains body within support on RW with UE on RW for entirety of gait.     AM-PAC 6 CLICK MOBILITY  Total Score:22       Treatment and Education:  Gait Speed: 0.15 m/s  <0.2m/s - likely to d/c to post-acute care  Normal gait speed for average healthy adult: 1.2m/s-1.4m/s    AM-PAC 6 CLICK MOBILITY  Total Score:22     Patient left sitting edge of bed with all lines intact, call button in reach, and RN and MD notified.    GOALS:   Multidisciplinary Problems       Physical Therapy Goals       Not on file              Multidisciplinary Problems (Resolved)          Problem: Physical Therapy    Goal Priority Disciplines Outcome Goal Variances Interventions   Physical Therapy Goal   (Resolved)     PT, PT/OT Met                         History:     Past Medical History:   Diagnosis Date    Diabetes mellitus     Digestive disorder        No past surgical history on file.    Time Tracking:     PT Received On: 05/19/23  PT Start Time: 1019     PT Stop Time: 1038  PT Total Time (min): 19 min     Billable Minutes: Evaluation 19 05/19/2023

## 2023-05-19 NOTE — ASSESSMENT & PLAN NOTE
Patient with acute kidney injury likely due to IVVD/dehydration SATISH is currently stable. Labs reviewed- Renal function/electrolytes with Estimated Creatinine Clearance: 45.5 mL/min (based on SCr of 1.2 mg/dL). according to latest data. Monitor urine output and serial BMP and adjust therapy as needed. Avoid nephrotoxins and renally dose meds for GFR listed above.

## 2023-05-19 NOTE — NURSING
Pt walker left in room after discharge. Nurse attempted to call home phone number on chart x2. Walker given to security.

## 2023-05-19 NOTE — ASSESSMENT & PLAN NOTE
Has a history of migraines  Has Imitrex on home med list  IV Toradol, IV compazine  PO Mag oxide

## 2023-05-19 NOTE — PLAN OF CARE
05/19/23 1247   Final Note   Assessment Type Final Discharge Note   Anticipated Discharge Disposition Home   Post-Acute Status   Discharge Delays None known at this time     Pt's daughter in law will pick her up at discharge. Pt is clear for discharge from a CM perspective. RN updated.

## 2023-05-19 NOTE — ASSESSMENT & PLAN NOTE
CT- No acute intracranial abnormalities identified  Hold medications that can alter mental status  CSF- elevated protein and glucose  Consult Neurology  IVF  Seen by ID, stopped abx, do not feel this is infectious  Neurology consulted:  Recommend MRI of the brain W W/O contrast to evaluate for any evidence of underlying inflammation to explain her elevated protein  For headache, Ibuprofen/Tylenol as needed + Mg oxide 400mg daily.  Resolved.

## 2023-05-19 NOTE — PLAN OF CARE
Problem: Physical Therapy  Goal: Physical Therapy Goal  Outcome: Met     Patient evaluated by PT and goals established. Patient with chronic balance issues with frequent falls without use of AD - multifocal issue with h/o diabetic neuropathy, migraines, and vertigo potentially contributing to instability. Pt demo's safe but slow mobility with RW, however without UE support pt at high risk for falls per Tinetti. Discussion of f/u with OP PT to address balance. PT will continue to follow and progress as tolerated. Rec for OP PT. Please see progress note for detailed plan of care and recommendations.

## 2023-05-22 ENCOUNTER — TELEPHONE (OUTPATIENT)
Dept: ADMINISTRATIVE | Facility: OTHER | Age: 63
End: 2023-05-22
Payer: MEDICAID

## 2023-05-22 LAB
BACTERIA BLD CULT: NORMAL
BACTERIA BLD CULT: NORMAL

## 2023-05-22 NOTE — TELEPHONE ENCOUNTER
Unable to reach patient by phone to discuss Neurology appointment. Letter has been mailed to her home.

## 2023-05-23 LAB
BACTERIA CSF CULT: NO GROWTH
GRAM STN SPEC: NORMAL
GRAM STN SPEC: NORMAL

## 2023-05-27 NOTE — DISCHARGE SUMMARY
[facility] Discharge Summary    Attending Physician: Bernadette    Date of Admit: 5/16/2023  Date of Discharge: 5/19/2023    Discharge to: Home  Condition: Stable    Discharge Diagnoses     Headache  Acute Confusion-resolved  Fever x1  SATISH-resolved    Consultants and Procedures     Consultants:  ID, Neurology    Procedures:   None    Brief History of Present Illness      The patient is a 62 y.o. female with a past medical history of DM and HIV who presents with multiple complaints, including headaches with neck pain and photophobia. Patient recounts a two week history of difficulty with her equilibrium, leading to falls when walking or turning. She also has difficulty concentrating and loses track of sentences as she is trying to express ideas. Patient reports sometimes seeing things that are not there. She denies any chest pain, cough, shortness of breath, diarrhea, or vomiting.  Her daughter-in-law provides additional history. Patient is compliant with medication.  Patient also had a tooth extraction one week ago (May 9).  On initial workup, the patient is noted to have elevated protein and glucose in the CSF.  The patient also has a mild SATISH with a creatinine of 2.  She will be admitted for further management of her acute encephalopathy and acute kidney injury with Neurology consult.       For the full HPI please refer to the History & Physical from this admission.    Hospital Course By Problem with Pertinent Findings     Acute encephalopathy-Resolved  Tm 100.5F  CT- No acute intracranial abnormalities identified  CSF- elevated protein and glucose  Recommend MRI of the brain W W/O contrast   No acute abnormality.  Sequelae of microvascular ischemic change appears slightly advanced for patient age.  Seen by ID, stopped abx, do not feel this is infectious  Neurology consulted     Headache  Has a history of migraines  Has Imitrex on home med list  PO Mag oxide  F/u with Neurology for headache  "management    UCx+ CoNS  As patient has well-controlled HIV and no symptoms suggesting UTI, normal WBC, likely asymptomatic bacteruria     HIV disease  This patient in known to have HIV+ status (Have detected HIV PCR but never CD4 <200 or AIDS defining illness). Labs reviewed- CD4 greater than 700 in March.  Patient is on HAART. Will continue HAART. Prophylaxis was not indicated at this time- . Continue to monitor routine labs.      SATISH (acute kidney injury)  Patient with acute kidney injury likely due to IVVD/dehydration SATISH is improved.        Type 2 diabetes mellitus with hyperglycemia, with long-term current use of insulin  Patient's FSGs are uncontrolled due to hyperglycemia on current medication regimen    Discharge Time: Greater than 30 minutes? Yes    /68 (BP Location: Right arm, Patient Position: Lying)   Pulse 90   Temp 98.3 °F (36.8 °C) (Oral)   Resp 16   Ht 5' 6" (1.676 m)   Wt 70.7 kg (155 lb 13.8 oz)   SpO2 95%   Breastfeeding No   BMI 25.16 kg/m²       Discharge Medications        Medication List        START taking these medications      butalbital-acetaminophen-caffeine -40 mg -40 mg per tablet  Commonly known as: FIORICET, ESGIC  Take 1 tablet by mouth every 6 (six) hours as needed for Headaches.     magnesium oxide 400 mg (241.3 mg magnesium) tablet  Commonly known as: MAG-OX  Take 1 tablet (400 mg total) by mouth once daily.     prochlorperazine 5 MG tablet  Commonly known as: COMPAZINE  Take 1 tablet (5 mg total) by mouth 3 (three) times daily as needed (nausea).            CONTINUE taking these medications      buPROPion 150 MG TB24 tablet  Commonly known as: WELLBUTRIN XL     famotidine 40 MG tablet  Commonly known as: PEPCID     gabapentin 800 MG tablet  Commonly known as: NEURONTIN     GENVOYA [id number] mg Tab  Generic drug: elviteg-cob-emtri-tenof ALAFEN     HumaLOG KwikPen Insulin 100 unit/mL pen  Generic drug: insulin lispro     hydrOXYzine pamoate 25 MG " Cap  Commonly known as: VISTARIL     LANTUS SOLOSTAR U-100 INSULIN glargine 100 units/mL SubQ pen  Generic drug: insulin     pravastatin 40 MG tablet  Commonly known as: PRAVACHOL     TRULICITY 1.5 mg/0.5 mL pen injector  Generic drug: dulaglutide               Where to Get Your Medications        These medications were sent to [facility]  [address] [address]      Hours: Mon-Fri, 8a-5:30p Phone: [phone]   butalbital-acetaminophen-caffeine -40 mg -40 mg per tablet  magnesium oxide 400 mg (241.3 mg magnesium) tablet  prochlorperazine 5 MG tablet         Discharge Information:   Diet:  Diabetic    Physical Activity:  As tolerated    Instructions:  1. Take all medications as prescribed  2. Keep all follow-up appointments  3. Return to the hospital or call your primary care physicians if any worsening symptoms such as  occur.      Follow-Up Appointments:  PCP in 1 week  Neurology in 2-4 weeks      Follow up items for PCP and tests that have not resulted at time of discharge:   None      Venus Fleming MD  [facility] Medicine

## 2023-08-21 PROBLEM — N17.9 AKI (ACUTE KIDNEY INJURY): Status: RESOLVED | Noted: 2023-05-16 | Resolved: 2023-08-21

## 2024-10-10 ENCOUNTER — HOSPITAL ENCOUNTER (EMERGENCY)
Facility: HOSPITAL | Age: 64
Discharge: HOME | End: 2024-10-10
Attending: EMERGENCY MEDICINE
Payer: MEDICAID

## 2024-10-10 VITALS
HEART RATE: 97 BPM | OXYGEN SATURATION: 99 % | SYSTOLIC BLOOD PRESSURE: 136 MMHG | WEIGHT: 155 LBS | TEMPERATURE: 98 F | BODY MASS INDEX: 24.91 KG/M2 | DIASTOLIC BLOOD PRESSURE: 84 MMHG | RESPIRATION RATE: 20 BRPM | HEIGHT: 66 IN

## 2024-10-10 DIAGNOSIS — S92.912B: ICD-10-CM

## 2024-10-10 DIAGNOSIS — B02.9 HERPES ZOSTER WITHOUT COMPLICATION: ICD-10-CM

## 2024-10-10 DIAGNOSIS — N39.0 URINARY TRACT INFECTION WITHOUT HEMATURIA, SITE UNSPECIFIED: Primary | ICD-10-CM

## 2024-10-10 DIAGNOSIS — S91.115A LACERATION OF SECOND TOE OF LEFT FOOT, INITIAL ENCOUNTER: ICD-10-CM

## 2024-10-10 LAB
ALBUMIN SERPL BCP-MCNC: 3 G/DL (ref 3.5–5.2)
ALP SERPL-CCNC: 103 U/L (ref 55–135)
ALT SERPL W/O P-5'-P-CCNC: 17 U/L (ref 10–44)
ANION GAP SERPL CALC-SCNC: 10 MMOL/L (ref 8–16)
AST SERPL-CCNC: 19 U/L (ref 10–40)
B-OH-BUTYR BLD STRIP-SCNC: 0.1 MMOL/L (ref 0–0.5)
BACTERIA #/AREA URNS AUTO: ABNORMAL /HPF
BASOPHILS # BLD AUTO: 0.04 K/UL (ref 0–0.2)
BASOPHILS NFR BLD: 0.5 % (ref 0–1.9)
BILIRUB SERPL-MCNC: 0.4 MG/DL (ref 0.1–1)
BILIRUB UR QL STRIP: NEGATIVE
BUN SERPL-MCNC: 16 MG/DL (ref 8–23)
CALCIUM SERPL-MCNC: 8.7 MG/DL (ref 8.7–10.5)
CHLORIDE SERPL-SCNC: 106 MMOL/L (ref 95–110)
CLARITY UR REFRACT.AUTO: ABNORMAL
CO2 SERPL-SCNC: 18 MMOL/L (ref 23–29)
COLOR UR AUTO: YELLOW
CREAT SERPL-MCNC: 1.1 MG/DL (ref 0.5–1.4)
DIFFERENTIAL METHOD BLD: ABNORMAL
EOSINOPHIL # BLD AUTO: 0 K/UL (ref 0–0.5)
EOSINOPHIL NFR BLD: 0.3 % (ref 0–8)
ERYTHROCYTE [DISTWIDTH] IN BLOOD BY AUTOMATED COUNT: 11.9 % (ref 11.5–14.5)
EST. GFR  (NO RACE VARIABLE): 56.1 ML/MIN/1.73 M^2
GLUCOSE SERPL-MCNC: 342 MG/DL (ref 70–110)
GLUCOSE UR QL STRIP: ABNORMAL
HCT VFR BLD AUTO: 35.4 % (ref 37–48.5)
HGB BLD-MCNC: 12.3 G/DL (ref 12–16)
HGB UR QL STRIP: ABNORMAL
IMM GRANULOCYTES # BLD AUTO: 0.03 K/UL (ref 0–0.04)
IMM GRANULOCYTES NFR BLD AUTO: 0.4 % (ref 0–0.5)
KETONES UR QL STRIP: NEGATIVE
LEUKOCYTE ESTERASE UR QL STRIP: ABNORMAL
LYMPHOCYTES # BLD AUTO: 2.3 K/UL (ref 1–4.8)
LYMPHOCYTES NFR BLD: 30.2 % (ref 18–48)
MCH RBC QN AUTO: 34.7 PG (ref 27–31)
MCHC RBC AUTO-ENTMCNC: 34.7 G/DL (ref 32–36)
MCV RBC AUTO: 100 FL (ref 82–98)
MICROSCOPIC COMMENT: ABNORMAL
MONOCYTES # BLD AUTO: 0.7 K/UL (ref 0.3–1)
MONOCYTES NFR BLD: 8.4 % (ref 4–15)
NEUTROPHILS # BLD AUTO: 4.7 K/UL (ref 1.8–7.7)
NEUTROPHILS NFR BLD: 60.2 % (ref 38–73)
NITRITE UR QL STRIP: NEGATIVE
NRBC BLD-RTO: 0 /100 WBC
PH UR STRIP: 6 [PH] (ref 5–8)
PLATELET # BLD AUTO: 269 K/UL (ref 150–450)
PMV BLD AUTO: 11.2 FL (ref 9.2–12.9)
POTASSIUM SERPL-SCNC: 3.4 MMOL/L (ref 3.5–5.1)
PROT SERPL-MCNC: 7 G/DL (ref 6–8.4)
PROT UR QL STRIP: ABNORMAL
RBC # BLD AUTO: 3.54 M/UL (ref 4–5.4)
RBC #/AREA URNS AUTO: 18 /HPF (ref 0–4)
SODIUM SERPL-SCNC: 134 MMOL/L (ref 136–145)
SP GR UR STRIP: 1.03 (ref 1–1.03)
SQUAMOUS #/AREA URNS AUTO: 2 /HPF
URN SPEC COLLECT METH UR: ABNORMAL
WBC # BLD AUTO: 7.72 K/UL (ref 3.9–12.7)
WBC #/AREA URNS AUTO: >100 /HPF (ref 0–5)
YEAST UR QL AUTO: ABNORMAL

## 2024-10-10 PROCEDURE — 99283 EMERGENCY DEPT VISIT LOW MDM: CPT | Mod: ,,, | Performed by: PODIATRIST

## 2024-10-10 PROCEDURE — 85025 COMPLETE CBC W/AUTO DIFF WBC: CPT

## 2024-10-10 PROCEDURE — 63600175 PHARM REV CODE 636 W HCPCS

## 2024-10-10 PROCEDURE — 96374 THER/PROPH/DIAG INJ IV PUSH: CPT

## 2024-10-10 PROCEDURE — 87591 N.GONORRHOEAE DNA AMP PROB: CPT | Performed by: EMERGENCY MEDICINE

## 2024-10-10 PROCEDURE — 81001 URINALYSIS AUTO W/SCOPE: CPT | Performed by: EMERGENCY MEDICINE

## 2024-10-10 PROCEDURE — 87491 CHLMYD TRACH DNA AMP PROBE: CPT | Performed by: EMERGENCY MEDICINE

## 2024-10-10 PROCEDURE — 80053 COMPREHEN METABOLIC PANEL: CPT | Performed by: EMERGENCY MEDICINE

## 2024-10-10 PROCEDURE — 25000003 PHARM REV CODE 250

## 2024-10-10 PROCEDURE — 82010 KETONE BODYS QUAN: CPT | Performed by: EMERGENCY MEDICINE

## 2024-10-10 PROCEDURE — 12001 RPR S/N/AX/GEN/TRNK 2.5CM/<: CPT

## 2024-10-10 PROCEDURE — 12001 RPR S/N/AX/GEN/TRNK 2.5CM/<: CPT | Mod: ,,, | Performed by: PODIATRIST

## 2024-10-10 PROCEDURE — 99284 EMERGENCY DEPT VISIT MOD MDM: CPT | Mod: 25

## 2024-10-10 PROCEDURE — 87086 URINE CULTURE/COLONY COUNT: CPT | Performed by: EMERGENCY MEDICINE

## 2024-10-10 RX ORDER — LIDOCAINE HYDROCHLORIDE 10 MG/ML
100 INJECTION, SOLUTION EPIDURAL; INFILTRATION; INTRACAUDAL; PERINEURAL
Status: COMPLETED | OUTPATIENT
Start: 2024-10-10 | End: 2024-10-10

## 2024-10-10 RX ORDER — VALACYCLOVIR HYDROCHLORIDE 1 G/1
1000 TABLET, FILM COATED ORAL 3 TIMES DAILY
Qty: 21 TABLET | Refills: 0 | Status: SHIPPED | OUTPATIENT
Start: 2024-10-10 | End: 2024-10-17

## 2024-10-10 RX ORDER — MORPHINE SULFATE 2 MG/ML
2 INJECTION, SOLUTION INTRAMUSCULAR; INTRAVENOUS
Status: COMPLETED | OUTPATIENT
Start: 2024-10-10 | End: 2024-10-10

## 2024-10-10 RX ORDER — CEPHALEXIN 500 MG/1
500 CAPSULE ORAL
Status: COMPLETED | OUTPATIENT
Start: 2024-10-10 | End: 2024-10-10

## 2024-10-10 RX ORDER — CEPHALEXIN 500 MG/1
500 CAPSULE ORAL 4 TIMES DAILY
Qty: 20 CAPSULE | Refills: 0 | Status: SHIPPED | OUTPATIENT
Start: 2024-10-10 | End: 2024-10-15

## 2024-10-10 RX ADMIN — LIDOCAINE HYDROCHLORIDE 100 MG: 10 INJECTION, SOLUTION EPIDURAL; INFILTRATION; INTRACAUDAL at 08:10

## 2024-10-10 RX ADMIN — MORPHINE SULFATE 2 MG: 2 INJECTION, SOLUTION INTRAMUSCULAR; INTRAVENOUS at 08:10

## 2024-10-10 RX ADMIN — CEPHALEXIN 500 MG: 500 CAPSULE ORAL at 08:10

## 2024-10-10 NOTE — ED TRIAGE NOTES
Pt arrives to ED via EMS for dysuria and injury to 2nd toe on left foot. Pt states for the past week urinating has been very painful. Pt was at ED yesterday for indira symptoms. Today patient was getting into car and slammed left foot into car door injuring and causing laceration to 2nd toe.

## 2024-10-11 LAB
BACTERIA UR CULT: NORMAL
BACTERIA UR CULT: NORMAL

## 2024-10-11 NOTE — ASSESSMENT & PLAN NOTE
Assesment  Laceration noted to L 2nd digit with no bone exposure.  Swelling and erythema noted.  Sanguinous drainage noted.  Tenderness to palpation.  No purulent drainage, malodor, or crepitus noted.  Palpable pedal pulses.  No other wounds noted.  XR L foot shows fracture involving distal aspect of of second digit.    Plan  -Physical exam and imaging findings discussed with the patient.  Discussed with the patient that XR shows fracture of distal phalanx of second test but overall stable.  Discussed with the patient that we can suture laceration and attempt to save her second toe.  Patient is still at risk of second digit not healing appropriately and may necrose, and eventually amputation.  Patient understands the plan and risks.  She would like to salvage her L second digit.  -L foot rinsed with copious amounts of Betadine and normal saline and sutured close.  Full procedure note to follow.  -L foot dressed with Xeroform, gauze, cast padding, and ACE wrap  -WBAT  -Podiatry will continue to follow    Discharge Recommendations  -Patient to follow up in outpatient Podiatry clinic. Podiatry will schedule  -Antibiotics per ED  -Weight bearing as tolerated.  -Keep dressings clean, dry, and intact until follow-up appointment

## 2024-10-11 NOTE — DISCHARGE INSTRUCTIONS
- Follow up with podiatry regarding toe fracture next week.   - Complete cephalexin course for UTI.  - Complete valtrex for blisters on groin.

## 2024-10-11 NOTE — SUBJECTIVE & OBJECTIVE
Scheduled Meds:  Continuous Infusions:  PRN Meds:    Review of patient's allergies indicates:   Allergen Reactions    Penicillins Swelling    Dapsone      G6PD deficiency  G6PD deficiency      Sulfamethoxazole-trimethoprim      G6PD deficiency  G6PD deficiency          Past Medical History:   Diagnosis Date    Diabetes mellitus     Digestive disorder      History reviewed. No pertinent surgical history.    Family History    Family history is unknown by patient.       Tobacco Use    Smoking status: Not on file    Smokeless tobacco: Not on file   Substance and Sexual Activity    Alcohol use: Not on file    Drug use: Not on file    Sexual activity: Not on file     Review of Systems   Constitutional: Negative.    HENT: Negative.     Eyes: Negative.    Respiratory: Negative.     Cardiovascular: Negative.    Gastrointestinal: Negative.    Endocrine: Negative.    Genitourinary: Negative.    Musculoskeletal:  Positive for gait problem.   Skin:  Positive for color change and wound.        Reports laceration to her L second digit   Allergic/Immunologic: Negative.    Hematological: Negative.    Psychiatric/Behavioral: Negative.       Objective:     Vital Signs (Most Recent):  Temp: 99.4 °F (37.4 °C) (10/10/24 1600)  Pulse: 94 (10/10/24 2035)  Resp: 18 (10/10/24 2020)  BP: (!) 138/93 (10/10/24 2035)  SpO2: 98 % (10/10/24 2035) Vital Signs (24h Range):  Temp:  [98.7 °F (37.1 °C)-99.4 °F (37.4 °C)] 99.4 °F (37.4 °C)  Pulse:  [] 94  Resp:  [16-18] 18  SpO2:  [98 %-100 %] 98 %  BP: (117-138)/(56-93) 138/93     Weight: 70.3 kg (155 lb)  Body mass index is 25.02 kg/m².    Foot Exam    Left Foot/Ankle      Inspection and Palpation  Ecchymosis: none  Tenderness: bony tenderness and lesser metatarsophalangeal joints   Swelling: dorsum and lesser metatarsophalangeal joints   Skin Exam: dry skin, skin changes, abnormal color, ulcer and erythema; no drainage and no cellulitis     Neurovascular  Dorsalis pedis: 1+  Posterior tibial:  1+  Saphenous nerve sensation: normal  Tibial nerve sensation: normal  Superficial peroneal nerve sensation: normal  Deep peroneal nerve sensation: normal  Sural nerve sensation: normal    Comments  L foot:  Laceration noted to second digit with no bone exposure.  Swelling and erythema noted.  Sanguinous drainage noted.  Tenderness to palpation.  No purulent drainage, malodor, or crepitus noted.  Palpable pedal pulses.  No other wounds noted.    Laboratory:  BMP:   Recent Labs   Lab 10/10/24  1933   *   *   K 3.4*      CO2 18*   BUN 16   CREATININE 1.1   CALCIUM 8.7     CBC:   Recent Labs   Lab 10/10/24  1817   WBC 7.72   RBC 3.54*   HGB 12.3   HCT 35.4*      *   MCH 34.7*   MCHC 34.7     Diagnostic Results:  I have reviewed all pertinent imaging results/findings within the past 24 hours.    Clinical Findings:  Pulse suture L second digit    Laceration L second    Plantar L foot

## 2024-10-11 NOTE — PROCEDURES
"Jori Richmond is a 64 y.o. female patient.    Temp: 99.4 °F (37.4 °C) (10/10/24 1600)  Pulse: 94 (10/10/24 2035)  Resp: 18 (10/10/24 2020)  BP: (!) 138/93 (10/10/24 2035)  SpO2: 98 % (10/10/24 2035)  Weight: 70.3 kg (155 lb) (10/10/24 1535)  Height: 5' 6" (167.6 cm) (10/10/24 1535)       Laceration Repair    Date/Time: 10/10/2024 5:21 PM    Performed by: Keyonna Dalal DPM  Authorized by: Lucio Baker DPM    Consent:     Consent obtained:  Verbal and emergent situation    Consent given by:  Patient    Risks, benefits, and alternatives were discussed: yes      Risks discussed:  Infection, pain, retained foreign body, need for additional repair, poor cosmetic result, tendon damage, nerve damage, poor wound healing and vascular damage    Alternatives discussed:  Delayed treatment, observation and referral  Universal protocol:     Procedure explained and questions answered to patient or proxy's satisfaction: yes      Relevant documents present and verified: yes      Test results available: yes      Imaging studies available: yes      Required blood products, implants, devices, and special equipment available: no      Site/side marked: no      Immediately prior to procedure, a time out was called: yes      Patient identity confirmed:  Verbally with patient, arm band and hospital-assigned identification number  Anesthesia:     Anesthesia method:  Local infiltration    Local anesthetic:  Lidocaine 1% w/o epi and bupivacaine 0.5% w/o epi  Laceration details:     Location:  Toe    Toe location:  L second toe    Length (cm):  2.5    Depth (mm):  0.2  Pre-procedure details:     Preparation:  Patient was prepped and draped in usual sterile fashion and imaging obtained to evaluate for foreign bodies  Exploration:     Limited defect created (wound extended): no      Hemostasis achieved with:  Direct pressure    Imaging obtained: x-ray      Imaging outcome: foreign body not noted      Wound exploration: wound explored " through full range of motion and entire depth of wound visualized      Wound extent: underlying fracture      Contaminated: yes    Treatment:     Area cleansed with:  Povidone-iodine and saline    Amount of cleaning:  Extensive    Irrigation solution:  Sterile saline    Irrigation volume:  500    Irrigation method:  Syringe    Visualized foreign bodies/material removed: no      Debridement:  None    Undermining:  None    Scar revision: no    Skin repair:     Repair method:  Sutures    Suture size:  3-0    Suture material:  Nylon    Suture technique:  Simple interrupted    Number of sutures:  7  Approximation:     Approximation:  Close  Repair type:     Repair type:  Simple  Post-procedure details:     Dressing:  Splint for protection, non-adherent dressing and sterile dressing    Procedure completion:  Tolerated      10/10/2024

## 2024-10-11 NOTE — HPI
64F with a PMHx of HIV, T2D, HLD, brain aneurysm and recent dysuria who presented to the ED and podiatry consulted for laceration to her L second.  Patient says car door slammed on her L foot that caused pain and she noticed bleeding.  She rated the pain 10/10 but now pain has improved with pain medication in the ED. patient denies any other lacerations to her L foot.  Denies fevers, chills, nausea, or vomiting.  Denies SOB or chest pain.  Denies any other pedal complaints.    VSS, afebrile, WBC WNL, .  XR L foot shows fracture of distal aspect of second toe.

## 2024-10-11 NOTE — CONSULTS
[facility] - Emergency Dept  Podiatry  Consult Note    Patient Name: Jori Richmond  MRN: [id number]  Admission Date: 10/10/2024  Hospital Length of Stay: 0 days  Attending Physician: Bhavesh Boles MD  Primary Care Provider: Tl Lebron Jr., MD     Inpatient consult to Podiatry  Consult performed by: Keyonna Dalal DPM  Consult ordered by: Nathalie Andrade DO  Reason for consult: see below        Subjective:     History of Present Illness:  64F with a PMHx of HIV, T2D, HLD, brain aneurysm and recent dysuria who presented to the ED and podiatry consulted for laceration to her L second.  Patient says car door slammed on her L foot that caused pain and she noticed bleeding.  She rated the pain 10/10 but now pain has improved with pain medication in the ED. patient denies any other lacerations to her L foot.  Denies fevers, chills, nausea, or vomiting.  Denies SOB or chest pain.  Denies any other pedal complaints.    VSS, afebrile, WBC WNL, .  XR L foot shows fracture of distal aspect of second toe.    Scheduled Meds:  Continuous Infusions:  PRN Meds:    Review of patient's allergies indicates:   Allergen Reactions    Penicillins Swelling    Dapsone      G6PD deficiency  G6PD deficiency      Sulfamethoxazole-trimethoprim      G6PD deficiency  G6PD deficiency          Past Medical History:   Diagnosis Date    Diabetes mellitus     Digestive disorder      History reviewed. No pertinent surgical history.    Family History    Family history is unknown by patient.       Tobacco Use    Smoking status: Not on file    Smokeless tobacco: Not on file   Substance and Sexual Activity    Alcohol use: Not on file    Drug use: Not on file    Sexual activity: Not on file     Review of Systems   Constitutional: Negative.    HENT: Negative.     Eyes: Negative.    Respiratory: Negative.     Cardiovascular: Negative.    Gastrointestinal: Negative.    Endocrine: Negative.    Genitourinary: Negative.    Musculoskeletal:   Positive for gait problem.   Skin:  Positive for color change and wound.        Reports laceration to her L second digit   Allergic/Immunologic: Negative.    Hematological: Negative.    Psychiatric/Behavioral: Negative.       Objective:     Vital Signs (Most Recent):  Temp: 99.4 °F (37.4 °C) (10/10/24 1600)  Pulse: 94 (10/10/24 2035)  Resp: 18 (10/10/24 2020)  BP: (!) 138/93 (10/10/24 2035)  SpO2: 98 % (10/10/24 2035) Vital Signs (24h Range):  Temp:  [98.7 °F (37.1 °C)-99.4 °F (37.4 °C)] 99.4 °F (37.4 °C)  Pulse:  [] 94  Resp:  [16-18] 18  SpO2:  [98 %-100 %] 98 %  BP: (117-138)/(56-93) 138/93     Weight: 70.3 kg (155 lb)  Body mass index is 25.02 kg/m².    Foot Exam    Left Foot/Ankle      Inspection and Palpation  Ecchymosis: none  Tenderness: bony tenderness and lesser metatarsophalangeal joints   Swelling: dorsum and lesser metatarsophalangeal joints   Skin Exam: dry skin, skin changes, abnormal color, ulcer and erythema; no drainage and no cellulitis     Neurovascular  Dorsalis pedis: 1+  Posterior tibial: 1+  Saphenous nerve sensation: normal  Tibial nerve sensation: normal  Superficial peroneal nerve sensation: normal  Deep peroneal nerve sensation: normal  Sural nerve sensation: normal    Comments  L foot:  Laceration noted to second digit with no bone exposure.  Swelling and erythema noted.  Sanguinous drainage noted.  Tenderness to palpation.  No purulent drainage, malodor, or crepitus noted.  Palpable pedal pulses.  No other wounds noted.    Laboratory:  BMP:   Recent Labs   Lab 10/10/24  1933   *   *   K 3.4*      CO2 18*   BUN 16   CREATININE 1.1   CALCIUM 8.7     CBC:   Recent Labs   Lab 10/10/24  1817   WBC 7.72   RBC 3.54*   HGB 12.3   HCT 35.4*      *   MCH 34.7*   MCHC 34.7     Diagnostic Results:  I have reviewed all pertinent imaging results/findings within the past 24 hours.    Clinical Findings:  Pulse suture L second digit    Laceration L  second    Plantar L foot      Assessment/Plan:     Orthopedic  Laceration of second toe of left foot  Assesment  Laceration noted to L 2nd digit with no bone exposure.  Swelling and erythema noted.  Sanguinous drainage noted.  Tenderness to palpation.  No purulent drainage, malodor, or crepitus noted.  Palpable pedal pulses.  No other wounds noted.  XR L foot shows fracture involving distal aspect of of second digit.    Plan  -Physical exam and imaging findings discussed with the patient.  Discussed with the patient that XR shows fracture of distal phalanx of second toe, but overall stable.  Discussed with the patient that we can suture laceration and attempt to save her second toe.  Patient is still at risk of second digit not healing appropriately and may necrose, and eventually need amputation.  Patient understands the plan and risks.  She would like to salvage her L second digit.  -L foot rinsed with copious amounts of Betadine and normal saline and sutured close.  Full procedure note to follow.  -L foot dressed with Xeroform, gauze, cast padding, and ACE wrap  -WBAT  -Podiatry will continue to follow    Discharge Recommendations  -Patient to follow up in outpatient Podiatry clinic. Podiatry will schedule  -Antibiotics per ED  -Weight bearing as tolerated.  -Keep dressings clean, dry, and intact until follow-up appointment    Thank you for your consult. I will follow-up with patient. Please contact us if you have any additional questions.    Keyonna Dalal DPM  Podiatry  [facility] Dept

## 2024-10-13 LAB
C TRACH DNA SPEC QL NAA+PROBE: NOT DETECTED
N GONORRHOEA DNA SPEC QL NAA+PROBE: NOT DETECTED

## 2025-03-19 ENCOUNTER — TELEPHONE (OUTPATIENT)
Dept: PODIATRY | Facility: CLINIC | Age: 65
End: 2025-03-19
Payer: MEDICAID

## 2025-03-19 NOTE — TELEPHONE ENCOUNTER
Spoke with daughter from [place]. Informed her that pt amputation was not done with [facility]. Pt daughter verbalized understanding.

## 2025-03-19 NOTE — TELEPHONE ENCOUNTER
----- Message from Ana sent at 3/19/2025  8:43 AM CDT -----  Type:  Needs Medical AdviceWho Called: [email or web address] the patient rather a call back or a response via [facility]? Call Best Call Back Number: [phone] Information: requesting a call back to discuss further instruction on toe amputation cleaning.

## 2025-07-25 ENCOUNTER — OFFICE VISIT (OUTPATIENT)
Dept: URGENT CARE | Facility: CLINIC | Age: 65
End: 2025-07-25
Payer: MEDICARE

## 2025-07-25 VITALS
OXYGEN SATURATION: 97 % | HEART RATE: 88 BPM | SYSTOLIC BLOOD PRESSURE: 123 MMHG | BODY MASS INDEX: 20.89 KG/M2 | TEMPERATURE: 98 F | DIASTOLIC BLOOD PRESSURE: 72 MMHG | HEIGHT: 66 IN | RESPIRATION RATE: 19 BRPM | WEIGHT: 130 LBS

## 2025-07-25 DIAGNOSIS — T78.40XA ALLERGY, INITIAL ENCOUNTER: ICD-10-CM

## 2025-07-25 DIAGNOSIS — L29.9 ITCHING: Primary | ICD-10-CM

## 2025-07-25 DIAGNOSIS — L85.3 DRY SKIN DERMATITIS: ICD-10-CM

## 2025-07-25 RX ORDER — HYDROXYZINE HYDROCHLORIDE 25 MG/1
25 TABLET, FILM COATED ORAL NIGHTLY PRN
Qty: 30 TABLET | Refills: 0 | Status: SHIPPED | OUTPATIENT
Start: 2025-07-25 | End: 2025-08-24

## 2025-07-25 RX ORDER — AMMONIUM LACTATE 12 G/100G
LOTION TOPICAL 2 TIMES DAILY PRN
Qty: 225 G | Refills: 0 | Status: SHIPPED | OUTPATIENT
Start: 2025-07-25 | End: 2025-07-30

## 2025-07-25 NOTE — PROGRESS NOTES
"Subjective:      Patient ID: Jori Richmond is a 65 y.o. female.    Vitals:  height is 5' 6" (1.676 m) and weight is 59 kg (130 lb). Her oral temperature is 97.6 °F (36.4 °C). Her blood pressure is 123/72 and her pulse is 88. Her respiration is 19 and oxygen saturation is 97%.     Chief Complaint: Rash    Patient presents with itching all over body. Onset 1 day.    Provider note begins below    Patient states this itching happened about a month ago.  She started a new medication couple days ago for headaches.  She has a primary care that is following up with her.  She would like hydroxyzine for the itching.  Denies a rash or erythema.  Denies any shortness a breath.  No new foods, lotions, soaps, or fabric softeners.  Patient does not know the name of the new medicine she has started for headaches.    Rash  This is a new problem. The current episode started yesterday. The affected locations include the face, chest, torso, back, left upper leg, left lower leg, right arm, right lower leg, right upper leg and left arm. The rash is characterized by itchiness. She was exposed to nothing. Pertinent negatives include no cough, fatigue, fever or shortness of breath. Past treatments include nothing. The treatment provided no relief.       Constitution: Negative for sweating, fatigue and fever.   Cardiovascular:  Negative for chest pain and sob on exertion.   Respiratory:  Negative for cough and shortness of breath.    Skin:  Negative for rash and erythema.      Objective:     Physical Exam   Constitutional: She is oriented to person, place, and time.   HENT:   Head: Normocephalic and atraumatic.   Cardiovascular: Normal rate, regular rhythm and normal heart sounds.   Pulmonary/Chest: Effort normal and breath sounds normal. No respiratory distress.   Abdominal: Normal appearance.   Neurological: She is alert and oriented to person, place, and time.   Skin: Skin is warm, dry and no rash. No erythema   Psychiatric: Her " behavior is normal. Mood normal.       Assessment:     1. Itching    2. Allergy, initial encounter    3. Dry skin dermatitis        Plan:   Hydroxyzine   Lac-Hydrin   Follow up with PCP regarding medication  No rashes.          Itching  -     hydrOXYzine HCL (ATARAX) 25 MG tablet; Take 1 tablet (25 mg total) by mouth nightly as needed for Itching.  Dispense: 30 tablet; Refill: 0  -     ammonium lactate (LAC-HYDRIN) 12 % lotion; Apply topically 2 (two) times daily as needed for Dry Skin.  Dispense: 225 g; Refill: 0    Allergy, initial encounter    Dry skin dermatitis